# Patient Record
Sex: FEMALE | Race: WHITE | Employment: OTHER | URBAN - METROPOLITAN AREA
[De-identification: names, ages, dates, MRNs, and addresses within clinical notes are randomized per-mention and may not be internally consistent; named-entity substitution may affect disease eponyms.]

---

## 2017-09-08 ENCOUNTER — TELEPHONE (OUTPATIENT)
Dept: INTERNAL MEDICINE | Facility: CLINIC | Age: 57
End: 2017-09-08

## 2017-09-08 NOTE — TELEPHONE ENCOUNTER
Fax received from [facility] for review and signature.  Put in Dr. Walker's in basket.

## 2018-01-02 ENCOUNTER — OFFICE VISIT (OUTPATIENT)
Dept: URGENT CARE | Facility: URGENT CARE | Age: 58
End: 2018-01-02
Payer: COMMERCIAL

## 2018-01-02 VITALS
DIASTOLIC BLOOD PRESSURE: 82 MMHG | OXYGEN SATURATION: 96 % | RESPIRATION RATE: 16 BRPM | SYSTOLIC BLOOD PRESSURE: 154 MMHG | HEART RATE: 93 BPM | WEIGHT: 204.5 LBS | BODY MASS INDEX: 33.01 KG/M2 | TEMPERATURE: 97.6 F

## 2018-01-02 DIAGNOSIS — R03.0 ELEVATED BLOOD PRESSURE READING WITHOUT DIAGNOSIS OF HYPERTENSION: ICD-10-CM

## 2018-01-02 DIAGNOSIS — J01.40 ACUTE NON-RECURRENT PANSINUSITIS: Primary | ICD-10-CM

## 2018-01-02 PROCEDURE — 99214 OFFICE O/P EST MOD 30 MIN: CPT | Performed by: PHYSICIAN ASSISTANT

## 2018-01-02 NOTE — MR AVS SNAPSHOT
After Visit Summary   1/2/2018    Lisbet Archer    MRN: [id number]           Patient Information     Date Of Birth          1960        Visit Information        Provider Department      1/2/2018 8:45 PM Sonja Weeks PA-C [facility]        Today's Diagnoses     Acute non-recurrent pansinusitis    -  1       Follow-ups after your visit        Additional Services     OTOLARYNGOLOGY REFERRAL       Your provider has referred you to: [facility]: [facility] [phone]   [email or web address]    Please be aware that coverage of these services is subject to the terms and limitations of your health insurance plan.  Call member services at your health plan with any benefit or coverage questions.      Please bring the following with you to your appointment:    (1) Any X-Rays, CTs or MRIs which have been performed.  Contact the facility where they were done to arrange for  prior to your scheduled appointment.   (2) List of current medications  (3) This referral request   (4) Any documents/labs given to you for this referral                  Who to contact     If you have questions or need follow up information about today's clinic visit or your schedule please contact [facility] directly at [phone].  Normal or non-critical lab and imaging results will be communicated to you by MyChart, letter or phone within 4 business days after the clinic has received the results. If you do not hear from us within 7 days, please contact the clinic through MyChart or phone. If you have a critical or abnormal lab result, we will notify you by phone as soon as possible.  Submit refill requests through [employer] or call your pharmacy and they will forward the refill request to us. Please allow 3 business days for your refill to be completed.          Additional Information About Your Visit        MyChart Information      [employer] gives you secure access to your electronic health record. If you see a primary care provider, you can also send messages to your care team and make appointments. If you have questions, please call your primary care clinic.  If you do not have a primary care provider, please call [phone] and they will assist you.        Care EveryWhere ID     This is your Care EveryWhere ID. This could be used by other organizations to access your [place] medical records  [id number]        Your Vitals Were     Pulse Temperature Respirations Last Period Pulse Oximetry BMI (Body Mass Index)    93 97.6  F (36.4  C) (Oral) 16 12/22/2009 96% 33.01 kg/m2       Blood Pressure from Last 3 Encounters:   01/02/18 (!) 180/94   12/07/16 130/78   04/22/16 150/70    Weight from Last 3 Encounters:   01/02/18 204 lb 8 oz (92.8 kg)   04/22/16 210 lb (95.3 kg)   10/20/15 208 lb (94.3 kg)              We Performed the Following     OTOLARYNGOLOGY REFERRAL          Today's Medication Changes          These changes are accurate as of: 1/2/18  9:04 PM.  If you have any questions, ask your nurse or doctor.               Start taking these medicines.        Dose/Directions    amoxicillin-clavulanate 875-125 MG per tablet   Commonly known as:  AUGMENTIN   Used for:  Acute non-recurrent pansinusitis   Started by:  Sonja Weeks PA-C        Dose:  1 tablet   Take 1 tablet by mouth 2 times daily for 10 days   Quantity:  20 tablet   Refills:  0            Where to get your medicines      These medications were sent to [facility] [address]  [address] AT [facility] [place] [address]     Phone:  [phone]     amoxicillin-clavulanate 875-125 MG per tablet                Primary Care Provider Office Phone # Fax #    Neena Walker [phone]       [address]  [facility] [address]        Equal Access to Services     FARRAH PÉREZ AH: Abram hayes  kulwinder Bradford, wachenchoda luannamariaadaha, qaybta kalemuel casas, henri andryin hayaan rashadsiobhan marylukarolina laannabelbrie radha. So [facility] [phone].    ATENCIÓN: Si habla bria, tiene a clarke disposición servicios gratuitos de asistencia lingüística. Vi al [phone].    We comply with applicable federal civil rights laws and [place] laws. We do not discriminate on the basis of race, color, national origin, age, disability, sex, sexual orientation, or gender identity.            Thank you!     Thank you for choosing [facility]  for your care. Our goal is always to provide you with excellent care. Hearing back from our patients is one way we can continue to improve our services. Please take a few minutes to complete the written survey that you may receive in the mail after your visit with us. Thank you!             Your Updated Medication List - Protect others around you: Learn how to safely use, store and throw away your medicines at www.disposemymeds.org.          This list is accurate as of: 1/2/18  9:04 PM.  Always use your most recent med list.                   Brand Name Dispense Instructions for use Diagnosis    amoxicillin-clavulanate 875-125 MG per tablet    AUGMENTIN    20 tablet    Take 1 tablet by mouth 2 times daily for 10 days    Acute non-recurrent pansinusitis       fluticasone 50 MCG/ACT spray    FLONASE     Spray 1 spray into both nostrils as needed        gemfibrozil 600 MG tablet    LOPID    180 tablet    Take 1 tablet (600 mg) by mouth 2 times daily    Hypertriglyceridemia       ibuprofen 200 MG tablet    ADVIL/MOTRIN     1-2 TABLETS EVERY 4 TO 6 HOURS AS NEEDED

## 2018-01-03 ASSESSMENT — ENCOUNTER SYMPTOMS
SHORTNESS OF BREATH: 0
COUGH: 0
HEADACHES: 0
SINUS PAIN: 1
FEVER: 0
CHILLS: 0
VOMITING: 0
FOCAL WEAKNESS: 0
ABDOMINAL PAIN: 0
NAUSEA: 0
DIARRHEA: 0

## 2018-01-03 NOTE — NURSING NOTE
"Chief Complaint   Patient presents with     URI     facial pain/pressure. has been taking Mucinex, Allegra, and Ibuprofen       Initial BP (!) 180/94 (BP Location: Left arm, Patient Position: Sitting, Cuff Size: Adult Large)  Pulse 93  Temp 97.6  F (36.4  C) (Oral)  Resp 16  Wt 204 lb 8 oz (92.8 kg)  LMP 12/22/2009  SpO2 96%  BMI 33.01 kg/m2 Estimated body mass index is 33.01 kg/(m^2) as calculated from the following:    Height as of 10/20/15: 5' 6\" (1.676 m).    Weight as of this encounter: 204 lb 8 oz (92.8 kg).  Medication Reconciliation: complete     Princess CINDA Brandt CMA      "

## 2018-01-03 NOTE — PROGRESS NOTES
HPI  January 3, 2018    HPI: Lisbet Archer is a 57 year old female who complains of moderate congestion, facial pain & sinus pressure onset 2 weeks ago. Pt had chronic sinusitis issues 20 yrs ago, then went many years without issues, but the past few years has been getting 1-2 sinus infections per year. She wonders if this correlates with being around dogs in her home again. Also has hx allergic rhinitis and takes Allegra when she starts to feel symptoms coming on. Has been trying ibuprofen & Mucinex as well. Symptoms are constant in duration. Denies fever/chills, cough, HA, CP, SOB, abd pain, N/V/D, rash, or any other symptoms. Patient denies sick contacts.    Hx breast cancer, in remission.  BP noted to be elevated today, no hx HTN.    Past Medical History:   Diagnosis Date     Benign neoplasm of colon 1/06, 6/13    Tubular adenoma     Malignant neoplasm (H)     right breast     Personal history of malignant neoplasm of breast 6/90     Pure hyperglyceridemia      Past Surgical History:   Procedure Laterality Date     ARTHROSCOPY KNEE RT/LT  2008    left meniscal tear     C NONSPECIFIC PROCEDURE  6/90    Right mastectomy and chemo      COLONOSCOPY       HC REMOVAL GALLBLADDER  1989     Social History   Substance Use Topics     Smoking status: Never Smoker     Smokeless tobacco: Never Used     Alcohol use No     Patient Active Problem List   Diagnosis     Personal history of malignant neoplasm of breast     Benign neoplasm of colon     CARDIOVASCULAR SCREENING; LDL GOAL LESS THAN 160     Chronic low back pain     Knee pain     Neck pain     Hypertriglyceridemia     Elevated blood pressure reading without diagnosis of hypertension     Family History   Problem Relation Age of Onset     CANCER Mother      Lung     C.A.D. Mother      AAA     Eye Disorder Mother      Glaucoma     Genitourinary Problems Father      Renal failure     Respiratory Father      COPD     DIABETES Brother      DIABETES Sister      DIABETES  Sister         Problem list, Medication list, Allergies, and Medical/Social/Surgical histories reviewed in [facility] and updated as appropriate.      Review of Systems   Constitutional: Negative for chills and fever.   HENT: Positive for congestion and sinus pain.    Respiratory: Negative for cough and shortness of breath.    Cardiovascular: Negative for chest pain.   Gastrointestinal: Negative for abdominal pain, diarrhea, nausea and vomiting.   Skin: Negative for rash.   Neurological: Negative for focal weakness and headaches.   All other systems reviewed and are negative.        Physical Exam   Constitutional: She is oriented to person, place, and time and well-developed, well-nourished, and in no distress.   HENT:   Head: Normocephalic and atraumatic.   Right Ear: Tympanic membrane, external ear and ear canal normal.   Left Ear: Tympanic membrane, external ear and ear canal normal.   Nose: Mucosal edema present. Right sinus exhibits maxillary sinus tenderness and frontal sinus tenderness. Left sinus exhibits maxillary sinus tenderness and frontal sinus tenderness.   Mouth/Throat: Uvula is midline, oropharynx is clear and moist and mucous membranes are normal.   Worst sinus TTP over L frontal sinus   Cardiovascular: Normal rate, regular rhythm and normal heart sounds.    Pulmonary/Chest: Effort normal and breath sounds normal.   Musculoskeletal: Normal range of motion.   Neurological: She is alert and oriented to person, place, and time. Gait normal.   Skin: Skin is warm and dry.   Nursing note and vitals reviewed.      Vital Signs  /82 (BP Location: Left arm, Patient Position: Sitting, Cuff Size: Adult Large)  Pulse 93  Temp 97.6  F (36.4  C) (Oral)  Resp 16  Wt 204 lb 8 oz (92.8 kg)  LMP 12/22/2009  SpO2 96%  BMI 33.01 kg/m2     Diagnostic Test Results:  none     ASSESSMENT/PLAN      ICD-10-CM    1. Acute non-recurrent pansinusitis J01.40 amoxicillin-clavulanate (AUGMENTIN) 875-125 MG per tablet      OTOLARYNGOLOGY REFERRAL   2. Elevated blood pressure reading without diagnosis of hypertension R03.0       Rx Augmentin for sinusitis due to symptoms present > 10 days. Continue Allegra & Mucinex. Pt referred to ENT b/c she would like to discuss strategies to avoid chronic sinusitis again.    /94 on arrival, now 154/82. Has not been taking Sudafed or Dayquil. Advised checking BP a few times/week and keeping log, f/u with PCP regarding this.      I have discussed any lab or imaging results, the patient's diagnosis, and my plan of treatment with the patient and/or family. Patient is aware to come back in if with worsening symptoms or if no relief despite treatment plan.  Patient voiced understanding and had no further questions.       Follow Up: Return if symptoms worsen or fail to improve.    RIYA Reyes, PA-C  [facility]

## 2018-02-10 ENCOUNTER — HEALTH MAINTENANCE LETTER (OUTPATIENT)
Age: 58
End: 2018-02-10

## 2018-05-21 ENCOUNTER — OFFICE VISIT (OUTPATIENT)
Dept: INTERNAL MEDICINE | Facility: CLINIC | Age: 58
End: 2018-05-21
Payer: COMMERCIAL

## 2018-05-21 VITALS
HEART RATE: 80 BPM | TEMPERATURE: 98.1 F | WEIGHT: 199.9 LBS | OXYGEN SATURATION: 97 % | SYSTOLIC BLOOD PRESSURE: 138 MMHG | BODY MASS INDEX: 30.3 KG/M2 | DIASTOLIC BLOOD PRESSURE: 72 MMHG | RESPIRATION RATE: 16 BRPM | HEIGHT: 68 IN

## 2018-05-21 DIAGNOSIS — C50.911 MALIGNANT NEOPLASM OF RIGHT FEMALE BREAST, UNSPECIFIED ESTROGEN RECEPTOR STATUS, UNSPECIFIED SITE OF BREAST (H): ICD-10-CM

## 2018-05-21 DIAGNOSIS — E78.1 HYPERTRIGLYCERIDEMIA: ICD-10-CM

## 2018-05-21 DIAGNOSIS — Z78.0 ASYMPTOMATIC POSTMENOPAUSAL STATUS: ICD-10-CM

## 2018-05-21 DIAGNOSIS — Z00.00 ENCOUNTER FOR ROUTINE ADULT HEALTH EXAMINATION WITHOUT ABNORMAL FINDINGS: Primary | ICD-10-CM

## 2018-05-21 DIAGNOSIS — Z82.49 FAMILY HISTORY OF PREMATURE CORONARY HEART DISEASE: ICD-10-CM

## 2018-05-21 DIAGNOSIS — L98.9 SKIN LESION: ICD-10-CM

## 2018-05-21 LAB
ANION GAP SERPL CALCULATED.3IONS-SCNC: 9 MMOL/L (ref 3–14)
BUN SERPL-MCNC: 9 MG/DL (ref 7–30)
CALCIUM SERPL-MCNC: 9.3 MG/DL (ref 8.5–10.1)
CHLORIDE SERPL-SCNC: 103 MMOL/L (ref 94–109)
CHOLEST SERPL-MCNC: 193 MG/DL
CO2 SERPL-SCNC: 25 MMOL/L (ref 20–32)
CREAT SERPL-MCNC: 0.63 MG/DL (ref 0.52–1.04)
GFR SERPL CREATININE-BSD FRML MDRD: >90 ML/MIN/1.7M2
GLUCOSE SERPL-MCNC: 106 MG/DL (ref 70–99)
HDLC SERPL-MCNC: 28 MG/DL
LDLC SERPL CALC-MCNC: 114 MG/DL
NONHDLC SERPL-MCNC: 165 MG/DL
POTASSIUM SERPL-SCNC: 4.1 MMOL/L (ref 3.4–5.3)
SODIUM SERPL-SCNC: 137 MMOL/L (ref 133–144)
TRIGL SERPL-MCNC: 257 MG/DL

## 2018-05-21 PROCEDURE — 36415 COLL VENOUS BLD VENIPUNCTURE: CPT | Performed by: INTERNAL MEDICINE

## 2018-05-21 PROCEDURE — 99396 PREV VISIT EST AGE 40-64: CPT | Performed by: INTERNAL MEDICINE

## 2018-05-21 PROCEDURE — 80061 LIPID PANEL: CPT | Performed by: INTERNAL MEDICINE

## 2018-05-21 PROCEDURE — 86141 C-REACTIVE PROTEIN HS: CPT | Performed by: INTERNAL MEDICINE

## 2018-05-21 PROCEDURE — 80048 BASIC METABOLIC PNL TOTAL CA: CPT | Performed by: INTERNAL MEDICINE

## 2018-05-21 RX ORDER — GEMFIBROZIL 600 MG/1
600 TABLET, FILM COATED ORAL 2 TIMES DAILY
Qty: 180 TABLET | Refills: 3 | Status: SHIPPED | OUTPATIENT
Start: 2018-05-21 | End: 2020-03-26

## 2018-05-21 NOTE — PROGRESS NOTES
SUBJECTIVE:   CC: Lisbet Archer is an 57 year old woman who presents for preventive health visit.     Physical   Annual:     Getting at least 3 servings of Calcium per day::  NO    Bi-annual eye exam::  Yes    Dental care twice a year::  Yes    Sleep apnea or symptoms of sleep apnea::  None and Excessive snoring    Frequency of exercise::  2-3 days/week    Duration of exercise::  15-30 minutes    Taking medications regularly::  Yes    Medication side effects::  None    Additional concerns today::  YES            Current concerns:       Today's PHQ-2 Score:   PHQ-2 ( 1999 [employer]) 5/20/2018   Q1: Little interest or pleasure in doing things 0   Q2: Feeling down, depressed or hopeless 0   PHQ-2 Score 0   Q1: Little interest or pleasure in doing things Not at all   Q2: Feeling down, depressed or hopeless Not at all   PHQ-2 Score 0       Abuse: Current or Past(Physical, Sexual or Emotional)- No  Do you feel safe in your environment - Yes    Social History   Substance Use Topics     Smoking status: Never Smoker     Smokeless tobacco: Never Used     Alcohol use No     Alcohol Use 5/20/2018   If you drink alcohol do you typically have greater than 3 drinks per day OR greater than 7 drinks per week? Not Applicable   No flowsheet data found.    Reviewed orders with patient.  Reviewed health maintenance and updated orders accordingly - Yes      Patient over age 50, mutual decision to screen reflected in health maintenance.    Pertinent mammograms are reviewed under the imaging tab.  History of abnormal Pap smear: NO - age 30-65 PAP every 5 years with negative HPV co-testing recommended    Reviewed and updated as needed this visit by clinical staff         Reviewed and updated as needed this visit by Provider            Review of Systems    Physical Exam    Patient Active Problem List   Diagnosis     Breast cancer (H)     CARDIOVASCULAR SCREENING; LDL GOAL LESS THAN 160     Bilateral low back pain without sciatica      "Knee pain     Neck pain     Hypertriglyceridemia     Elevated blood pressure reading without diagnosis of hypertension     /  Current Outpatient Prescriptions   Medication Sig Dispense Refill     fluticasone (FLONASE) 50 MCG/ACT nasal spray Spray 1 spray into both nostrils as needed        gemfibrozil (LOPID) 600 MG tablet Take 1 tablet (600 mg) by mouth 2 times daily (Patient not taking: Reported on 5/21/2018) 180 tablet 3     IBUPROFEN 200 MG OR TABS 1-2 TABLETS EVERY 4 TO 6 HOURS AS NEEDED        Review Of Systems  Skin: few lesions  Eyes: negative  Ears/Nose/Throat: negative  Respiratory: No dyspnea on exertion and No cough  Cardiovascular: negative  Gastrointestinal: negative  Genitourinary: negative  Musculoskeletal: negative  Neurologic: negative  Psychiatric: negative  Hematologic/Lymphatic/Immunologic: negative  Endocrine: negative   Gynecologic ros reveals:no breast pain or new or enlarging lumps on self exam, no vaginal bleeding, no discharge or pelvic pain    Objective:  Patient alert, in no acute distress  /72 (BP Location: Left arm, Cuff Size: Adult Large)  Pulse 80  Temp 98.1  F (36.7  C) (Oral)  Resp 16  Ht 5' 7.75\" (1.721 m)  Wt 199 lb 14.4 oz (90.7 kg)  LMP 12/22/2009  SpO2 97%  BMI 30.62 kg/m2    HEENT: extraocular movements are intact, pupils equal and reactive to light and accommodation, TMs clear, oropharynx clear  NECK: Neck supple. No adenopathy. Thyroid symmetric, normal size,, Carotids without bruits.  PULMONARY: clear to auscultation  CARDIAC: regular rate and rhythm and no murmurs, clicks, or gallops  PULSES: 2/2 throughout  BACK: no spinal or CVAT  ABDOMINAL: Soft, nontender.  Normal bowel sounds.  No hepatosplenomegaly or abnormal masses  BREAST: right surgically absent, left No breast masses or tenderness, No axillary masses or tenderness and No galactorrhea  PELVIC: external genitalia normal,, bimanual exam with normal uterus and adnexa,   REFLEXES: 2+ throughout  SKIN: " "red patch at right lower breast, possible seb k on left leg, tag on back       ASSESSMENT/PLAN:   1. Encounter for routine adult health examination without abnormal findings    - Lipid panel reflex to direct LDL Fasting  - Basic metabolic panel  - DX Hip/Pelvis/Spine; Future    2. Skin lesion  Refer derm for check,   - DERMATOLOGY REFERRAL    3. Family history of premature coronary heart disease  Check lab  - Lipid panel reflex to direct LDL Fasting  - CRP cardiac risk    4. Hypertriglyceridemia  Renew med  - gemfibrozil (LOPID) 600 MG tablet; Take 1 tablet (600 mg) by mouth 2 times daily  Dispense: 180 tablet; Refill: 3    5. Malignant neoplasm of right female breast, unspecified estrogen receptor status, unspecified site of breast (H)    - *MA Screening Digital Bilateral; Future    6. Asymptomatic postmenopausal status    - DX Hip/Pelvis/Spine; Future    COUNSELING:  Reviewed preventive health counseling, as reflected in patient instructions    BP Screening:   Last 3 BP Readings:    BP Readings from Last 3 Encounters:   05/21/18 138/72   01/02/18 154/82   12/07/16 130/78       The following was recommended to the patient:  Re-screen BP within a year and recommended lifestyle modifications     reports that she has never smoked. She has never used smokeless tobacco.    Estimated body mass index is 33.01 kg/(m^2) as calculated from the following:    Height as of 10/20/15: 5' 6\" (1.676 m).    Weight as of 1/2/18: 204 lb 8 oz (92.8 kg).   Weight management plan: Discussed healthy diet and exercise guidelines and patient will follow up in 12 months in clinic to re-evaluate.    Counseling Resources:  ATP IV Guidelines  Pooled Cohorts Equation Calculator  Breast Cancer Risk Calculator  FRAX Risk Assessment  ICSI Preventive Guidelines  Dietary Guidelines for Americans, 2010  [employer] MyPlate  ASA Prophylaxis  Lung CA Screening    Neena Walker MD  [facility]  Answers for HPI/ROS submitted by the patient on " 5/20/2018   PHQ-2 Score: 0

## 2018-05-21 NOTE — MR AVS SNAPSHOT
After Visit Summary   5/21/2018    Lisbet Archer    MRN: [id number]           Patient Information     Date Of Birth          1960        Visit Information        Provider Department      5/21/2018 9:20 AM Neena Walker MD [facility]        Today's Diagnoses     Encounter for routine adult health examination without abnormal findings    -  1    Skin lesion        Family history of premature coronary heart disease          Care Instructions      PREVENTIVE HEALTH RECOMMENDATIONS:     Vaccines: Get a flu shot each year. Get a tetanus shot every 10 years.     Exercise for at least 150 minutes a week (an average of 30 minutes a day, 5 days of the week). This will help you control your weight and prevent disease.    Limit alcohol to one drink per day.    No smoking.     Wear sunscreen to prevent skin cancer.     See your dentist twice a year for an exam and cleaning.    Try to get Calcium 1200 mg total per day. It is best to not take it all at once. Try to get Vitamin D at least 6492-5330 units per day.    BMI or Body Mass Index is a way of indicating weight and health risk for cardiovascular diseases, high blood pressure, diabetes.   Definitions:    Underweight is less than 18.5 and will be associated with health risk.   Normal BMI is 18.5 to 25   Overweight is 25-29   Obesity is 30 or greater   Morbid Obesity is 40 or greater or 35 or greater with diabetes, prediabetes or abnormal blood sugar, high blood pressure or elevated cholesterol  Obesity and Morbid Obesity are associated with higher health risks. Lowering calories, exercising more may lower your BMI and even small decreases can have positive impact on lowering health risks.   Your Body mass index is 30.62 kg/(m^2)..,              Follow-ups after your visit        Additional Services     DERMATOLOGY REFERRAL       Your provider has referred you to: FMG: [facility] [phone]    [email or web address]  FMG: [facility] [place] [phone]  FHN: [facility] [place] [phone]   [email or web address]  N: [facility] [place] [phone]   [email or web address]    Please be aware that coverage of these services is subject to the terms and limitations of your health insurance plan.  Call member services at your health plan with any benefit or coverage questions.      Please bring the following with you to your appointment:    (1) Any X-Rays, CTs or MRIs which have been performed.  Contact the facility where they were done to arrange for  prior to your scheduled appointment.    (2) List of current medications  (3) This referral request   (4) Any documents/labs given to you for this referral                  Who to contact     If you have questions or need follow up information about today's clinic visit or your schedule please contact [facility] directly at [phone].  Normal or non-critical lab and imaging results will be communicated to you by [employer], letter or phone within 4 business days after the clinic has received the results. If you do not hear from us within 7 days, please contact the clinic through [employer] or phone. If you have a critical or abnormal lab result, we will notify you by phone as soon as possible.  Submit refill requests through [employer] or call your pharmacy and they will forward the refill request to us. Please allow 3 business days for your refill to be completed.          Additional Information About Your Visit        [employer] Information     [employer] gives you secure access to your electronic health record. If you see a primary care provider, you can also send messages to your care team and make appointments. If you have questions, please call your primary care clinic.  If you do not have a primary care provider, please call [phone]  "and they will assist you.        Care EveryWhere ID     This is your Care EveryWhere ID. This could be used by other organizations to access your [place] medical records  [id number]        Your Vitals Were     Pulse Temperature Respirations Height Last Period Pulse Oximetry    80 98.1  F (36.7  C) (Oral) 16 5' 7.75\" (1.721 m) 12/22/2009 97%    BMI (Body Mass Index)                   30.62 kg/m2            Blood Pressure from Last 3 Encounters:   05/21/18 138/72   01/02/18 154/82   12/07/16 130/78    Weight from Last 3 Encounters:   05/21/18 199 lb 14.4 oz (90.7 kg)   01/02/18 204 lb 8 oz (92.8 kg)   04/22/16 210 lb (95.3 kg)              We Performed the Following     Basic metabolic panel     CRP cardiac risk     DERMATOLOGY REFERRAL     Lipid panel reflex to direct LDL Fasting        Primary Care Provider Office Phone # Fax #    Neena Walker [phone]       [address] [facility] [address]  [facility] [address]        Equal Access to Services     [facility]: Hadii aad ku hadasho Soomaali, waaxda luqadaha, qaybta kaalmada adeegyada, henri thomas . So [facility] [phone].    ATENCIÓN: Si habla español, tiene a clarke disposición servicios gratuitos de asistencia lingüística. Llame al [phone].    We comply with applicable federal civil rights laws and [place] laws. We do not discriminate on the basis of race, color, national origin, age, disability, sex, sexual orientation, or gender identity.            Thank you!     Thank you for choosing [facility]  for your care. Our goal is always to provide you with excellent care. Hearing back from our patients is one way we can continue to improve our services. Please take a few minutes to complete the written survey that you may receive in the mail after your visit with us. Thank you!             Your Updated Medication List - Protect others around you: Learn how to safely use, store and throw away your medicines " at www.disposemymeds.org.          This list is accurate as of 5/21/18 10:06 AM.  Always use your most recent med list.                   Brand Name Dispense Instructions for use Diagnosis    fluticasone 50 MCG/ACT spray    FLONASE     Spray 1 spray into both nostrils as needed        gemfibrozil 600 MG tablet    LOPID    180 tablet    Take 1 tablet (600 mg) by mouth 2 times daily    Hypertriglyceridemia       ibuprofen 200 MG tablet    ADVIL/MOTRIN     1-2 TABLETS EVERY 4 TO 6 HOURS AS NEEDED

## 2018-05-21 NOTE — PATIENT INSTRUCTIONS
PREVENTIVE HEALTH RECOMMENDATIONS:     Vaccines: Get a flu shot each year. Get a tetanus shot every 10 years.     Exercise for at least 150 minutes a week (an average of 30 minutes a day, 5 days of the week). This will help you control your weight and prevent disease.    Limit alcohol to one drink per day.    No smoking.     Wear sunscreen to prevent skin cancer.     See your dentist twice a year for an exam and cleaning.    Try to get Calcium 1200 mg total per day. It is best to not take it all at once. Try to get Vitamin D at least 0638-8103 units per day.    BMI or Body Mass Index is a way of indicating weight and health risk for cardiovascular diseases, high blood pressure, diabetes.   Definitions:    Underweight is less than 18.5 and will be associated with health risk.   Normal BMI is 18.5 to 25   Overweight is 25-29   Obesity is 30 or greater   Morbid Obesity is 40 or greater or 35 or greater with diabetes, prediabetes or abnormal blood sugar, high blood pressure or elevated cholesterol  Obesity and Morbid Obesity are associated with higher health risks. Lowering calories, exercising more may lower your BMI and even small decreases can have positive impact on lowering health risks.   Your Body mass index is 30.62 kg/(m^2)..,

## 2018-05-22 LAB — CRP SERPL HS-MCNC: 6.7 MG/L

## 2018-05-22 RX ORDER — GEMFIBROZIL 600 MG/1
TABLET, FILM COATED ORAL
Qty: 180 TABLET | Refills: 3 | OUTPATIENT
Start: 2018-05-22

## 2018-05-22 NOTE — TELEPHONE ENCOUNTER
"Duplicate         Requested Prescriptions   Pending Prescriptions Disp Refills     gemfibrozil (LOPID) 600 MG tablet [Pharmacy Med Name: GEMFIBROZIL 600MG TABLETS] 180 tablet 3     Sig: TAKE 1 TABLET BY MOUTH TWICE DAILY    Fibrates Failed    5/21/2018  1:16 PM       Failed - Lipid panel on file in past 12 months    Recent Labs   Lab Test  05/21/18   1014  07/28/15   1031   CHOL  193  170   TRIG  257*  204*   HDL  28*  30*   LDL  114*  99   NHDL  165*   --    VLDL   --   41*   CHOLHDLRATIO   --   5.7*              Passed - No abnormal creatine kinase in past 12 months    No lab results found.            Passed - Recent (12 mo) or future (30 days) visit within the authorizing provider's specialty    Patient had office visit in the last 12 months or has a visit in the next 30 days with authorizing provider or within the authorizing provider's specialty.  See \"Patient Info\" tab in inbasket, or \"Choose Columns\" in Meds & Orders section of the refill encounter.           Passed - Patient is age 18 or older       Passed - No active pregnancy on record       Passed - No positive pregnancy test in past 12 months          "

## 2018-06-13 ENCOUNTER — HOSPITAL ENCOUNTER (OUTPATIENT)
Dept: MAMMOGRAPHY | Facility: CLINIC | Age: 58
Discharge: HOME | End: 2018-06-13
Attending: INTERNAL MEDICINE | Admitting: INTERNAL MEDICINE
Payer: COMMERCIAL

## 2018-06-13 ENCOUNTER — RADIANT APPOINTMENT (OUTPATIENT)
Dept: BONE DENSITY | Facility: CLINIC | Age: 58
End: 2018-06-13
Attending: INTERNAL MEDICINE
Payer: COMMERCIAL

## 2018-06-13 DIAGNOSIS — C50.911 MALIGNANT NEOPLASM OF RIGHT FEMALE BREAST, UNSPECIFIED ESTROGEN RECEPTOR STATUS, UNSPECIFIED SITE OF BREAST (H): ICD-10-CM

## 2018-06-13 DIAGNOSIS — Z00.00 ENCOUNTER FOR ROUTINE ADULT HEALTH EXAMINATION WITHOUT ABNORMAL FINDINGS: ICD-10-CM

## 2018-06-13 DIAGNOSIS — Z78.0 ASYMPTOMATIC POSTMENOPAUSAL STATUS: ICD-10-CM

## 2018-06-13 PROCEDURE — 77080 DXA BONE DENSITY AXIAL: CPT | Performed by: INTERNAL MEDICINE

## 2018-06-13 PROCEDURE — 77067 SCR MAMMO BI INCL CAD: CPT | Mod: 52

## 2018-08-25 ENCOUNTER — HEALTH MAINTENANCE LETTER (OUTPATIENT)
Age: 58
End: 2018-08-25

## 2019-01-01 ENCOUNTER — HOSPITAL ENCOUNTER (EMERGENCY)
Facility: CLINIC | Age: 59
Discharge: HOME | End: 2019-01-01
Attending: EMERGENCY MEDICINE | Admitting: EMERGENCY MEDICINE
Payer: COMMERCIAL

## 2019-01-01 VITALS
WEIGHT: 218.8 LBS | OXYGEN SATURATION: 94 % | HEIGHT: 67 IN | TEMPERATURE: 98.1 F | DIASTOLIC BLOOD PRESSURE: 105 MMHG | SYSTOLIC BLOOD PRESSURE: 194 MMHG | HEART RATE: 76 BPM | BODY MASS INDEX: 34.34 KG/M2

## 2019-01-01 DIAGNOSIS — S05.02XA ABRASION OF LEFT CORNEA, INITIAL ENCOUNTER: ICD-10-CM

## 2019-01-01 PROCEDURE — 99283 EMERGENCY DEPT VISIT LOW MDM: CPT

## 2019-01-01 PROCEDURE — 25000125 ZZHC RX 250: Performed by: EMERGENCY MEDICINE

## 2019-01-01 RX ORDER — SULFACETAMIDE SODIUM 100 MG/ML
1 SOLUTION/ DROPS OPHTHALMIC 4 TIMES DAILY
Qty: 1 ML | Refills: 0 | Status: SHIPPED | OUTPATIENT
Start: 2019-01-01 | End: 2019-01-06

## 2019-01-01 RX ORDER — PROPARACAINE HYDROCHLORIDE 5 MG/ML
1 SOLUTION/ DROPS OPHTHALMIC ONCE
Status: COMPLETED | OUTPATIENT
Start: 2019-01-01 | End: 2019-01-01

## 2019-01-01 RX ADMIN — PROPARACAINE HYDROCHLORIDE 1 DROP: 5 SOLUTION/ DROPS OPHTHALMIC at 22:38

## 2019-01-01 RX ADMIN — FLUORESCEIN SODIUM 1 STRIP: 1 STRIP OPHTHALMIC at 22:38

## 2019-01-01 ASSESSMENT — MIFFLIN-ST. JEOR: SCORE: 1600.1

## 2019-01-01 NOTE — ED AVS SNAPSHOT
Emergency Department  [address] [place] [address]  Phone:  [phone]  Fax:  [phone]                                    Lisbet Archer   MRN: [id number]    Department:   Emergency Department   Date of Visit:  1/1/2019           After Visit Summary Signature Page    I have received my discharge instructions, and my questions have been answered. I have discussed any challenges I see with this plan with the nurse or doctor.    ..........................................................................................................................................  Patient/Patient Representative Signature      ..........................................................................................................................................  Patient Representative Print Name and Relationship to Patient    ..................................................               ................................................  Date                                   Time    ..........................................................................................................................................  Reviewed by Signature/Title    ...................................................              ..............................................  Date                                               Time          22EPIC Rev 08/18

## 2019-01-02 ASSESSMENT — ENCOUNTER SYMPTOMS
EYE DISCHARGE: 0
EYE PAIN: 1
RESPIRATORY NEGATIVE: 1
PHOTOPHOBIA: 1
CONSTITUTIONAL NEGATIVE: 1

## 2019-01-02 NOTE — ED PROVIDER NOTES
"  History     Chief Complaint:  Eye Pain    HPI   Lisbet Archer is a 58 year old female with a history of breast cancer who presents to the ED for evaluation of eye pain. The patient reports that this evening around 2115, her dog scratched her left eye with his paw. She subsequently developed left eye pain and slightly blurred vision that she attributes to increased tears from that eye, so she presented to the ED for evaluation. Here in the ED, she states she had no other injuries. She denies any other symptoms or concerns. She has not taken any ibuprofen or tylenol at home for the pain. She does not wear contacts at baseline, and only wears glasses with driving. Of note, she does currently have a sinus infection, and is on day 8 of antibiotics.     Allergies:  Lorazepam    Medications:    Lopid  Flonase  Amoxicillin    Past Medical History:    Breast cancer  Hyperglyceridemia  Hypertriglyceridemia    Past Surgical History:    Knee arthroscopy  Colonoscopy  Cholecystectomy  Mastectomy    Family History:    Lung cancer  CAD  AAA  Glaucoma  Renal failure  COPD  Diabetes    Social History:  Marital Status:   [2]  Negative for tobacco use.  Negative for alcohol use.  Negative for drug use.     Review of Systems   Constitutional: Negative.    HENT: Negative.    Eyes: Positive for photophobia and pain. Negative for discharge.   Respiratory: Negative.        Physical Exam     Patient Vitals for the past 24 hrs:   BP Temp Temp src Pulse SpO2 Height Weight   01/01/19 2204 (!) 209/108 98.1  F (36.7  C) Oral 94 96 % 1.702 m (5' 7\") 99.2 kg (218 lb 12.8 oz)   Visual Acuity: 20/50 bilaterally    Physical Exam   Eyes:         General: woman sitting upright in room 10,  at bedside  HENT: mucous membranes moist  Eyes: PERRL.  No proptosis or evidence of open globe.    Fluorescein exam with uptake -see diagram.  Negative Nas's test.   Eye exam otherwise normal  No hyphema or hypopyon.    No evidence of foreign " body.    Upper and lower eyelids normal.    Resp: normal effort  MSK: no bony tenderness to face  Skin: no facial erythema or vesicles  Neuro: alert, clear speech, oriented  Psych: pleasant, cooperative      Emergency Department Course   Interventions:  proparacaine 0.54% ophthalmic solution 1 drop left eye  Fluorescein ophthalmic strip 1 strip left eye    Emergency Department Course:  Nursing notes and vitals reviewed. (6985) I performed an exam of the patient as documented above.     Medicine administered as documented above.    (2230) I rechecked the patient and discussed the results of her workup thus far. I performed a slit lamp exam, as noted above.    Findings and plan explained to the Patient. Patient discharged home with instructions regarding supportive care, medications, and reasons to return. The importance of close follow-up was reviewed. The patient was prescribed sulfacetamide.    I personally answered all related questions prior to discharge.     Impression & Plan      Medical Decision Making:  She has fairly classic signs and symptoms of a corneal abrasion.  She did not wish to have her tetanus updated.  No evidence of open globe or other complicating factor.  Prophylactic antibiotics were administered as well as recommendation to follow-up with an eye doctor within several days if not steadily improving.  Potential complications including ulceration and infection were reviewed.  She declined any further analgesics.  Discharged home in improved condition after discussion of the above.    Diagnosis:    ICD-10-CM    1. Abrasion of left cornea, initial encounter S05.02XA      Disposition:  discharged to home    Discharge Medications:     Medication List      Started    sulfacetamide 10 % ophthalmic solution  Commonly known as:  BLEPH-10  1 drop, Ophthalmic, 4 TIMES DAILY          Scribe Disclosure:  Tg LISA, am serving as a scribe on 1/1/2019 at 10:12 PM to personally document services  performed by Otto Lopez MD based on my observations and the provider's statements to me.     This record was created at least in part using electronic voice recognition software, so please excuse any typographical errors.    Tg Morrow  1/1/2019    EMERGENCY DEPARTMENT       Otto Lopez MD  01/02/19 0028

## 2019-01-16 ENCOUNTER — OFFICE VISIT (OUTPATIENT)
Dept: INTERNAL MEDICINE | Facility: CLINIC | Age: 59
End: 2019-01-16
Payer: COMMERCIAL

## 2019-01-16 ENCOUNTER — ANCILLARY PROCEDURE (OUTPATIENT)
Dept: GENERAL RADIOLOGY | Facility: CLINIC | Age: 59
End: 2019-01-16
Payer: COMMERCIAL

## 2019-01-16 VITALS
TEMPERATURE: 98.3 F | DIASTOLIC BLOOD PRESSURE: 88 MMHG | RESPIRATION RATE: 18 BRPM | WEIGHT: 209.7 LBS | SYSTOLIC BLOOD PRESSURE: 150 MMHG | HEIGHT: 67 IN | OXYGEN SATURATION: 96 % | HEART RATE: 88 BPM | BODY MASS INDEX: 32.91 KG/M2

## 2019-01-16 DIAGNOSIS — C50.911 MALIGNANT NEOPLASM OF RIGHT FEMALE BREAST, UNSPECIFIED ESTROGEN RECEPTOR STATUS, UNSPECIFIED SITE OF BREAST (H): ICD-10-CM

## 2019-01-16 DIAGNOSIS — J06.9 UPPER RESPIRATORY TRACT INFECTION, UNSPECIFIED TYPE: ICD-10-CM

## 2019-01-16 DIAGNOSIS — G43.009 MIGRAINE WITHOUT AURA AND WITHOUT STATUS MIGRAINOSUS, NOT INTRACTABLE: ICD-10-CM

## 2019-01-16 DIAGNOSIS — J06.9 UPPER RESPIRATORY TRACT INFECTION, UNSPECIFIED TYPE: Primary | ICD-10-CM

## 2019-01-16 LAB
ERYTHROCYTE [DISTWIDTH] IN BLOOD BY AUTOMATED COUNT: 12.5 % (ref 10–15)
HCT VFR BLD AUTO: 42.7 % (ref 35–47)
HGB BLD-MCNC: 14 G/DL (ref 11.7–15.7)
MCH RBC QN AUTO: 29.9 PG (ref 26.5–33)
MCHC RBC AUTO-ENTMCNC: 32.8 G/DL (ref 31.5–36.5)
MCV RBC AUTO: 91 FL (ref 78–100)
PLATELET # BLD AUTO: 305 10E9/L (ref 150–450)
RBC # BLD AUTO: 4.68 10E12/L (ref 3.8–5.2)
WBC # BLD AUTO: 7.1 10E9/L (ref 4–11)

## 2019-01-16 PROCEDURE — 99214 OFFICE O/P EST MOD 30 MIN: CPT | Performed by: INTERNAL MEDICINE

## 2019-01-16 PROCEDURE — 36415 COLL VENOUS BLD VENIPUNCTURE: CPT | Performed by: INTERNAL MEDICINE

## 2019-01-16 PROCEDURE — 71046 X-RAY EXAM CHEST 2 VIEWS: CPT

## 2019-01-16 PROCEDURE — 85027 COMPLETE CBC AUTOMATED: CPT | Performed by: INTERNAL MEDICINE

## 2019-01-16 RX ORDER — AZITHROMYCIN 250 MG/1
TABLET, FILM COATED ORAL
Qty: 6 TABLET | Refills: 0 | Status: SHIPPED | OUTPATIENT
Start: 2019-01-16 | End: 2020-03-26

## 2019-01-16 RX ORDER — ALBUTEROL SULFATE 90 UG/1
2 AEROSOL, METERED RESPIRATORY (INHALATION) EVERY 4 HOURS PRN
Qty: 1 INHALER | Refills: 2 | Status: SHIPPED | OUTPATIENT
Start: 2019-01-16 | End: 2022-09-07

## 2019-01-16 ASSESSMENT — MIFFLIN-ST. JEOR: SCORE: 1558.82

## 2019-01-16 NOTE — PROGRESS NOTES
"  SUBJECTIVE:   Lisbet Archer is a 58 year old female who presents to clinic today for the following health issues:  Patient here for possible upper respiratory infection, would also like to discuss her recent high blood pressures.    HPI:   For the past month she has been struggling with a chronic cough. Will get better then come back. Each time she was exposed to some one ill. Sputum is scant but yellow to green and foul tasting when she gets it up. Denies any fever chills or night sweats. Early on had severe nasal congestion and some facial pain. That has resolved. Last 2 days she has had some transient wheezing but load enough that the dog barked at her.     For the past 6 months she has been getting more frequent migraine headaches. She also has noted more fatigue The headaches have been manageable. But she notes the 6 months before they found her breast cancer she had more migraines and fatigue.       Problem list and histories reviewed & adjusted, as indicated.  Additional history: as documented    BP Readings from Last 3 Encounters:   01/16/19 150/88   01/01/19 (!) 194/105   05/21/18 138/72    Wt Readings from Last 3 Encounters:   01/16/19 95.1 kg (209 lb 11.2 oz)   01/01/19 99.2 kg (218 lb 12.8 oz)   05/21/18 90.7 kg (199 lb 14.4 oz)                    Reviewed and updated as needed this visit by clinical staff  Tobacco  Allergies  Meds  Med Hx  Surg Hx  Fam Hx  Soc Hx      Reviewed and updated as needed this visit by Provider         ROS:  Constitutional, HEENT, cardiovascular, pulmonary, GI, , musculoskeletal, neuro, skin, endocrine and psych systems are negative, except as otherwise noted.    OBJECTIVE:     /88 (BP Location: Right arm, Patient Position: Sitting, Cuff Size: Adult Large)   Pulse 88   Temp 98.3  F (36.8  C) (Oral)   Resp 18   Ht 1.702 m (5' 7\")   Wt 95.1 kg (209 lb 11.2 oz)   LMP 12/22/2009   SpO2 96%   BMI 32.84 kg/m    Body mass index is 32.84 kg/m .  GENERAL: " healthy, alert and no distress  EYES: Eyes grossly normal to inspection, PERRL and conjunctivae and sclerae normal  HENT: ear canals and TM's normal, nose and mouth without ulcers or lesions  NECK: no adenopathy, no asymmetry, masses, or scars and thyroid normal to palpation  RESP: lungs clear to auscultation - no rales, rhonchi or wheezes  CV: regular rate and rhythm, normal S1 S2, no S3 or S4, no murmur, click or rub, no peripheral edema and peripheral pulses strong  ABDOMEN: soft, nontender, no hepatosplenomegaly, no masses and bowel sounds normal  MS: no gross musculoskeletal defects noted, no edema  NEURO: Normal strength and tone, mentation intact and speech normal  PSYCH: mentation appears normal, affect normal/bright    CHEST X-RAY: unremarkable     ASSESSMENT/PLAN:       1. Upper respiratory tract infection, unspecified type     - XR Chest 2 Views; Future  - CBC with platelets  - azithromycin (ZITHROMAX) 250 MG tablet; Two tablets first day, then one tablet daily for four days.  Dispense: 6 tablet; Refill: 0  - albuterol (PROAIR HFA/PROVENTIL HFA/VENTOLIN HFA) 108 (90 Base) MCG/ACT inhaler; Inhale 2 puffs into the lungs every 4 hours as needed for shortness of breath / dyspnea or wheezing  Dispense: 1 Inhaler; Refill: 2    2. Malignant neoplasm of right female breast, unspecified estrogen receptor status, unspecified site of breast (H)  offered reassurance last mammogram 6/18 and cxr normal     3. Migraine without aura and without status migrainosus, not intractable  Usual Conservative treatment recommended.       Marcie Devries MD  [facility]

## 2019-02-02 ENCOUNTER — OFFICE VISIT (OUTPATIENT)
Dept: URGENT CARE | Facility: URGENT CARE | Age: 59
End: 2019-02-02
Payer: COMMERCIAL

## 2019-02-02 ENCOUNTER — HOSPITAL ENCOUNTER (EMERGENCY)
Facility: CLINIC | Age: 59
Discharge: HOME | End: 2019-02-02
Attending: EMERGENCY MEDICINE | Admitting: EMERGENCY MEDICINE
Payer: COMMERCIAL

## 2019-02-02 ENCOUNTER — NURSE TRIAGE (OUTPATIENT)
Dept: NURSING | Facility: CLINIC | Age: 59
End: 2019-02-02

## 2019-02-02 VITALS
WEIGHT: 209 LBS | HEIGHT: 67 IN | OXYGEN SATURATION: 98 % | BODY MASS INDEX: 32.8 KG/M2 | RESPIRATION RATE: 18 BRPM | SYSTOLIC BLOOD PRESSURE: 198 MMHG | DIASTOLIC BLOOD PRESSURE: 100 MMHG

## 2019-02-02 VITALS
TEMPERATURE: 97.6 F | DIASTOLIC BLOOD PRESSURE: 97 MMHG | RESPIRATION RATE: 16 BRPM | OXYGEN SATURATION: 97 % | WEIGHT: 213 LBS | HEART RATE: 88 BPM | SYSTOLIC BLOOD PRESSURE: 182 MMHG | BODY MASS INDEX: 33.36 KG/M2

## 2019-02-02 DIAGNOSIS — J01.90 ACUTE SINUSITIS TREATED WITH ANTIBIOTICS IN THE PAST 60 DAYS: Primary | ICD-10-CM

## 2019-02-02 DIAGNOSIS — R03.0 ELEVATED BLOOD PRESSURE READING WITHOUT DIAGNOSIS OF HYPERTENSION: ICD-10-CM

## 2019-02-02 DIAGNOSIS — H57.12 ACUTE LEFT EYE PAIN: ICD-10-CM

## 2019-02-02 DIAGNOSIS — Z87.828 HISTORY OF CORNEAL ABRASION: ICD-10-CM

## 2019-02-02 PROCEDURE — 99283 EMERGENCY DEPT VISIT LOW MDM: CPT

## 2019-02-02 PROCEDURE — 99213 OFFICE O/P EST LOW 20 MIN: CPT | Performed by: FAMILY MEDICINE

## 2019-02-02 RX ORDER — DICLOFENAC SODIUM 1 MG/ML
1 SOLUTION/ DROPS OPHTHALMIC 4 TIMES DAILY PRN
Qty: 1 BOTTLE | Refills: 0 | Status: SHIPPED | OUTPATIENT
Start: 2019-02-02 | End: 2019-03-04

## 2019-02-02 RX ORDER — DICLOFENAC SODIUM 1 MG/ML
1 SOLUTION/ DROPS OPHTHALMIC 4 TIMES DAILY
COMMUNITY
End: 2022-09-07

## 2019-02-02 RX ORDER — PROPARACAINE HYDROCHLORIDE 5 MG/ML
1 SOLUTION/ DROPS OPHTHALMIC ONCE
Status: DISCONTINUED | OUTPATIENT
Start: 2019-02-02 | End: 2019-02-02 | Stop reason: HOSPADM

## 2019-02-02 ASSESSMENT — ENCOUNTER SYMPTOMS
EYE DISCHARGE: 1
EYE PAIN: 1
EYE ITCHING: 1
FEVER: 0
EYE REDNESS: 1
PHOTOPHOBIA: 1

## 2019-02-02 ASSESSMENT — PAIN SCALES - GENERAL: PAINLEVEL: WORST PAIN (10)

## 2019-02-02 ASSESSMENT — MIFFLIN-ST. JEOR: SCORE: 1555.65

## 2019-02-02 NOTE — DISCHARGE INSTRUCTIONS
Discharge Instructions  Hypertension - High Blood Pressure    During you visit to the Emergency Department, your blood pressure was higher than the recommended blood pressure.  This may be related to stress, pain, medication or other temporary conditions. In these cases, your blood pressure may return to normal on its own. If you have a history of high blood pressure, you may need to have your provider adjust your medications. Sometimes, your high measurement here may indicate that you have developed high blood pressure that will stay high unless it is treated. As a general rule, high blood pressure causes problems over years rather than days, weeks, or months. So, while it is important to treat blood pressure, it is rarely important to treat blood pressure immediately. Occasionally we will begin a medication in the Emergency Department; more often we will recommend close follow-up for medications with a primary doctor/clinic.    Generally, every Emergency Department visit should have a follow-up clinic visit with either a primary or a specialty clinic/provider. Please follow-up as instructed by your emergency provider today.    Return to the Emergency Department if you start to have:  A severe headache.  Chest pain.  Shortness of breath.  Weakness or numbness that affects one part of the body.  Confusion.  Vision changes.  Significant swelling of legs and/or eyes.  A reaction to any medication started in the Emergency Department.    What can I do to help myself?  Avoid alcohol.  Take any blood pressure medicine that you are prescribed.  Get a good night?s sleep.  Lower your salt intake.  Exercise.  Lose weight.  Manage stress.  See your doctor regularly    If blood pressure medication was started in the Emergency Department:  The medicine may not have an immediate effect. The body and brain determine what blood pressure you have. The medicine?s job is to retrain the body?s ?thermostat? to a lower blood  pressure.  You will need to follow up with your provider to see how this medicine is working for you.  If you were given a prescription for medicine here today, be sure to read all of the information (including the package insert) that comes with your prescription.  This will include important information about the medicine, its side effects, and any warnings that you need to know about.  The pharmacist who fills the prescription can provide more information and answer questions you may have about the medicine.  If you have questions or concerns that the pharmacist cannot address, please call or return to the Emergency Department.   Remember that you can always come back to the Emergency Department if you are not able to see your regular provider in the amount of time listed above, if you get any new symptoms, or if there is anything that worries you.

## 2019-02-02 NOTE — ED PROVIDER NOTES
"  History     Chief Complaint:  Eye pain and problem    HPI   Lisbet Archer is a 58 year old female who presents with left eye discomfort. The patient was initially seen on 1/1/2019 for an abrasion to her left eye after a pet had accidentally scratched it. The eye has since healed well. Over the past few days the patient notes that she was coming down with a URI type illness and this morning she woke up with tearing, mattering, and a crusted over left eye. She was having associated discomfort in the left eye which made it hard to keep it open as bright lights and blinking seemed to provoke the pain. She has no outright vision loss but has a hard time looking as it is difficult to keep the eye open. The patient denies any known injury or exposure to metal dust or extremely bright light sources. She denies fevers, or issues in her right eye.    Tetanus: 2006    Allergies:  Lorazepam - hallucinations     Medications:    Albuterol inhaler  Flonase  Lopid  Ibuprofen    Past Medical History:    Migraine  Malignant neoplasm of breast    Past Surgical History:    Left knee meniscal repair  Colonoscopy  Cholecystectomy  Mastectomy     Family History:    Lung cancer, AAA, Glaucoma, COPD - mother and father    Social History:  Smoking Status: Never Smoker  Alcohol Use: No  Marital Status:        Review of Systems   Constitutional: Negative for fever.   Eyes: Positive for photophobia, pain, discharge, redness and itching. Negative for visual disturbance.   All other systems reviewed and are negative.      Physical Exam   First Vitals:  BP: (!) 198/100  Heart Rate: 113  Resp: 18  Height: 170.2 cm (5' 7\")  Weight: 94.8 kg (209 lb)  SpO2: 98 %      Physical Exam  Eyes:  L sclera injected, no foreign body on flipping eyelids.  Pupils are equal and round    L photophobia, pressures 26 left and 26 right, slit lamp w/o cells and flare, fluorescein w/o areas of uptake though previous abrasion region is noted as an " abnormality in the surface  ENT:    External ears and nares normal  CV:  Rate as above with regular rhythm   Resp:  Breath sounds clear and equal bilaterally  MS:  Moves all extremities  Skin:  Warm and dry  Neuro:  Speech is normal and fluent. No apparent deficit.        Emergency Department Course     Interventions:  Proparacaine drop, left eye     Emergency Department Course:  Past medical records, nursing notes, and vitals reviewed.  0710: I performed an exam of the patient and obtained history, as documented above.    Detailed slit lamp exam Findings and plan explained to the Patient. Patient discharged home with instructions regarding supportive care, medications, and reasons to return. The importance of close follow-up was reviewed.      Impression & Plan      Medical Decision Making:  Lisbet Archer is here for evaluation of recurrent left eye redness and pain.  Workup is most suggestive of a superficial process, possibly related to pinkeye or healing of previous abrasion.  There is no evidence of active ulcer or abrasion on the surface of the eye.  Symptoms completely resolved after use of proparacaine.  Tetanus is confirmed to be up-to-date.  She will reinitiate the antibiotic drops that she has and follow-up closely with ophthalmology for reevaluation.  Visual acuity better in affected eye than non-affected eye.    Regarding the elevated BP, there is no chest pain or headache to suggest end organ dysfunction.  Reports history of white coat hypertension.  She will need close follow-up in clinic for reassessment and medication adjustment if persistently elevated.     After visit I spoke to pharmacist at [facility] to clarify discharge fluorescein order.  This was an error on my part as it was intended as the order for the strip while patient was here.  Also patient's antibiotic drop had opened and spilled.  New prescription to change antibiotic (in case resistant) to polytrim.    Diagnosis:    ICD-10-CM     1. Acute left eye pain H57.12    2. History of corneal abrasion Z87.828    3. Elevated blood pressure reading without diagnosis of hypertension R03.0      Discharge Medications:     diclofenac 0.1 % ophthalmic solution  Commonly known as:  VOLTAREN  1 drop, Left Eye, 4 TIMES DAILY PRN     fluorescein 0.6 MG ophthalmic strip  Commonly known as:  FUL-NORM  1 strip, Left Eye, ONCE        ASK your doctor about these medications    sulfacetamide 10 % ophthalmic solution  Commonly known as:  BLEPH-10  1 drop, Ophthalmic, 4 TIMES DAILY  Ask about: Should I take this medication?          Migel Overton  2/2/2019    EMERGENCY DEPARTMENT  I, Migel Overton, am serving as a scribe at 7:10 AM on 2/2/2019 to document services personally performed by Isis Hernández MD based on my observations and the provider's statements to me.       Isis Hernández MD  02/02/19 1005       Isis Hernández MD  02/02/19 1006

## 2019-02-02 NOTE — TELEPHONE ENCOUNTER
Patient calling reporting having sinus pain. Patient was seen at the emergency department this morning for eye pain. States her sinus pain was not addressed at the emergency department. States pain is not severe but has been taking ibuprofen for her eye pain already. Denies fever. Reports redness and swelling on her cheek. States did not have the redness and swelling this morning was seen at the emergency department. Advised patient to be seen within 4 hours. Caller verbalized understanding. Denies further questions.      Riaz Armendariz RN  [facility]     Reason for Disposition    [1] Redness or swelling on the cheek, forehead or around the eye AND [2] no fever    Additional Information    Negative: Severe difficulty breathing (e.g., struggling for each breath, speaks in single words)    Negative: Sounds like a life-threatening emergency to the triager    Negative: [1] Difficulty breathing AND [2] not from stuffy nose (e.g., not relieved by cleaning out the nose)    Negative: [1] SEVERE headache AND [2] fever    Negative: [1] Redness or swelling on the cheek, forehead or around the eye AND [2] fever    Negative: Fever > 104 F (40 C)    Negative: Patient sounds very sick or weak to the triager    Negative: [1] SEVERE pain AND [2] not improved 2 hours after pain medicine    Protocols used: SINUS PAIN OR CONGESTION-ADULT-AH

## 2019-12-16 ENCOUNTER — HEALTH MAINTENANCE LETTER (OUTPATIENT)
Age: 59
End: 2019-12-16

## 2020-02-05 NOTE — PROGRESS NOTES
SUBJECTIVE:   Lisbet Archer is a 58 year old female who presents to clinic today for the following health issues:    HPI     Treated this AM in ED for acute left eye pain/hx corneal abrasion in early Januarry- blood pressure still high from this.  Had increased sinus pain and pressure this week.  Treated with amoxicillin right before the holidays-- then a Zpak in early January.  Now new pain and pressure along LEFT frontal and maxillary sinuses.  No fever or chills.  No cough.    Problem list and histories reviewed & adjusted, as indicated.  Additional history: as documented        Patient Active Problem List   Diagnosis     Breast cancer (H)     CARDIOVASCULAR SCREENING; LDL GOAL LESS THAN 160     Bilateral low back pain without sciatica     Knee pain     Neck pain     Hypertriglyceridemia     Elevated blood pressure reading without diagnosis of hypertension     Migraine without aura and without status migrainosus, not intractable     Past Surgical History:   Procedure Laterality Date     ARTHROSCOPY KNEE RT/LT  2008    left meniscal tear     COLONOSCOPY       HC REMOVAL GALLBLADDER  1989     MASTECTOMY Right        Social History     Tobacco Use     Smoking status: Never Smoker     Smokeless tobacco: Never Used   Substance Use Topics     Alcohol use: No     Family History   Problem Relation Age of Onset     Cancer Mother         Lung     C.A.D. Mother         AAA     Eye Disorder Mother         Glaucoma     Genitourinary Problems Father         Renal failure     Respiratory Father         COPD     Diabetes Brother      Diabetes Sister      Diabetes Sister          Current Outpatient Medications   Medication Sig Dispense Refill     albuterol (PROAIR HFA/PROVENTIL HFA/VENTOLIN HFA) 108 (90 Base) MCG/ACT inhaler Inhale 2 puffs into the lungs every 4 hours as needed for shortness of breath / dyspnea or wheezing 1 Inhaler 2     diclofenac (VOLTAREN) 0.1 % ophthalmic solution Place 1 drop Into the left eye 4 times  "daily as needed for moderate pain 1 Bottle 0     diclofenac (VOLTAREN) 0.1 % ophthalmic solution 1 drop 4 times daily       fluorescein (FUL-NORM) 0.6 MG ophthalmic strip Place 600 mcg Into the left eye once for 1 dose 1 strip 0     gemfibrozil (LOPID) 600 MG tablet Take 1 tablet (600 mg) by mouth 2 times daily 180 tablet 3     azithromycin (ZITHROMAX) 250 MG tablet Two tablets first day, then one tablet daily for four days. (Patient not taking: Reported on 2/2/2019) 6 tablet 0     fluticasone (FLONASE) 50 MCG/ACT nasal spray Spray 1 spray into both nostrils as needed        IBUPROFEN 200 MG OR TABS 1-2 TABLETS EVERY 4 TO 6 HOURS AS NEEDED (Patient not taking: Reported on 2/2/2019)       Allergies   Allergen Reactions     Lorazepam      ATIVAN \"HALLUCINATIONS\"     Recent Labs   Lab Test 05/21/18  1014 07/28/15  1031 07/10/14  1016  06/06/11  0916 01/05/11  1521   * 99 128   < > 139*  --    HDL 28* 30* 28*   < > 32*  --    TRIG 257* 204* 311*   < > 150  --    ALT  --  46 43  --  23 31   CR 0.63 0.63 0.69   < >  --  0.70   GFRESTIMATED >90 >90  Non  GFR Calc   89   < >  --  88   GFRESTBLACK >90 >90   GFR Calc   >90   < >  --  >90   POTASSIUM 4.1 4.5 4.3   < >  --  4.0   TSH  --   --   --   --   --  0.53    < > = values in this interval not displayed.      BP Readings from Last 3 Encounters:   02/02/19 (!) 182/97   02/02/19 (!) 198/100   01/16/19 150/88    Wt Readings from Last 3 Encounters:   02/02/19 96.6 kg (213 lb)   02/02/19 94.8 kg (209 lb)   01/16/19 95.1 kg (209 lb 11.2 oz)                    ROS:  Constitutional, HEENT, cardiovascular, pulmonary, gi and gu systems are negative, except as otherwise noted.    OBJECTIVE:     BP (!) 182/97   Pulse 88   Temp 97.6  F (36.4  C) (Oral)   Resp 16   Wt 96.6 kg (213 lb)   LMP 12/22/2009   SpO2 97%   BMI 33.36 kg/m    Body mass index is 33.36 kg/m .  GENERAL: healthy, alert and no distress  EYES: Eyes grossly normal to " inspection, PERRL and conjunctivae and sclerae normal  HENT: ear canals and TM's normal, nose and mouth without ulcers or lesions, increased sinus pain and pressure over LEFT frontal and LEFT maxillary sinuses today  NECK: no adenopathy, no asymmetry, masses, or scars and thyroid normal to palpation  RESP: lungs clear to auscultation - no rales, rhonchi or wheezes  CV: regular rate and rhythm, normal S1 S2, no S3 or S4, no murmur, click or rub, no peripheral edema and peripheral pulses strong  ABDOMEN: soft, nontender, no hepatosplenomegaly, no masses and bowel sounds normal  MS: no gross musculoskeletal defects noted, no edema  PSYCH: mentation appears normal, affect normal/bright    ASSESSMENT/PLAN:     1. Acute sinusitis treated with antibiotics in the past 60 days    - amoxicillin-clavulanate (AUGMENTIN) 875-125 MG tablet; Take 1 tablet by mouth 2 times daily for 10 days  Dispense: 20 tablet; Refill: 0    Will treat with Augmentin bid x 10 days.  Recommend increased fluids and  Close Follow-up if no change or worsening symptoms prn.    CS Urgent Care Provider  [facility]   negative - no fever

## 2020-03-26 ENCOUNTER — VIRTUAL VISIT (OUTPATIENT)
Dept: INTERNAL MEDICINE | Facility: CLINIC | Age: 60
End: 2020-03-26
Payer: COMMERCIAL

## 2020-03-26 DIAGNOSIS — J01.00 ACUTE NON-RECURRENT MAXILLARY SINUSITIS: Primary | ICD-10-CM

## 2020-03-26 DIAGNOSIS — E78.1 HYPERTRIGLYCERIDEMIA: ICD-10-CM

## 2020-03-26 PROCEDURE — 99214 OFFICE O/P EST MOD 30 MIN: CPT | Mod: TEL | Performed by: INTERNAL MEDICINE

## 2020-03-26 RX ORDER — GEMFIBROZIL 600 MG/1
600 TABLET, FILM COATED ORAL 2 TIMES DAILY
Qty: 180 TABLET | Refills: 3 | Status: SHIPPED | OUTPATIENT
Start: 2020-03-26 | End: 2021-06-11

## 2020-03-26 RX ORDER — AZITHROMYCIN 250 MG/1
TABLET, FILM COATED ORAL
Qty: 6 TABLET | Refills: 0 | Status: SHIPPED | OUTPATIENT
Start: 2020-03-26 | End: 2020-03-31

## 2020-03-26 NOTE — PROGRESS NOTES
Subjective     Lisbet Archer is a 59 year old female who presents for telephone visit today for the following health issues:    HPI     Sinus symptoms: She reports she was having some symptoms in early January with a lot of nasal congestion, some pressure.  She treated with nasal spray, steam, allergy medications ibuprofen.  That seemed to work fairly well and the symptoms seem to resolve other than some very mild cough and minor congestion than 2 to 3 weeks ago the symptoms increased again.  She continue doing over-the-counter treatment but despite that 2 days ago she suddenly had significant worsening of pressure, pain.  She was getting purulent mucus when she would do nasal flushing.  Yesterday it was worse with pain in her teeth, throbbing when she bends forward.  She has been having some malaise/fatigue with it.  She has some postnasal drainage.  The cough is overall stable.    She also is going to need a refill of her gemfibrozil soon.  She had not had her labs done last year but they have been stable for many years.      Patient Active Problem List   Diagnosis     Breast cancer (H)     CARDIOVASCULAR SCREENING; LDL GOAL LESS THAN 160     Bilateral low back pain without sciatica     Knee pain     Neck pain     Hypertriglyceridemia     Elevated blood pressure reading without diagnosis of hypertension     Migraine without aura and without status migrainosus, not intractable     Current Outpatient Medications   Medication Sig Dispense Refill     diclofenac (VOLTAREN) 0.1 % ophthalmic solution 1 drop 4 times daily       fexofenadine (ALLEGRA) 30 MG ODT Take 30 mg by mouth as needed for allergies       fluticasone (FLONASE) 50 MCG/ACT nasal spray Spray 1 spray into both nostrils as needed        gemfibrozil (LOPID) 600 MG tablet Take 1 tablet (600 mg) by mouth 2 times daily 180 tablet 3     IBUPROFEN 200 MG OR TABS 1-2 TABLETS EVERY 4 TO 6 HOURS AS NEEDED       albuterol (PROAIR HFA/PROVENTIL HFA/VENTOLIN HFA)  108 (90 Base) MCG/ACT inhaler Inhale 2 puffs into the lungs every 4 hours as needed for shortness of breath / dyspnea or wheezing 1 Inhaler 2      Social History     Tobacco Use     Smoking status: Never Smoker     Smokeless tobacco: Never Used   Substance Use Topics     Alcohol use: No     Drug use: No          Reviewed and updated as needed this visit by Provider         Review of Systems     No fever, chills, stable cough, chest pain.             Assessment & Plan     1. Acute non-recurrent maxillary sinusitis  Her symptoms are consistent with bacterial sinusitis.  She has tried over-the-counter treatment but despite that her symptoms have persisted for few weeks and have worsened.  We will go ahead and treat with Z-Xavier, recommend Mucinex over-the-counter and continue doing the rest of her over-the-counter treatments.  She had some concerns about taking ibuprofen because of news related to coronavirus but advised for few days it is acceptable to take that to help with the pressure and pain.  - azithromycin (ZITHROMAX) 250 MG tablet; Take 2 tablets (500 mg) by mouth daily for 1 day, THEN 1 tablet (250 mg) daily for 4 days.  Dispense: 6 tablet; Refill: 0    2. Hypertriglyceridemia  We will refill medication for now, advised we are delaying physicals till later in the summer because of the pandemic, can schedule them.  - gemfibrozil (LOPID) 600 MG tablet; Take 1 tablet (600 mg) by mouth 2 times daily  Dispense: 180 tablet; Refill: 3       7 minutes and 8 seconds spent on phone visit.    Neena Walker MD  [facility]

## 2020-08-13 ENCOUNTER — VIRTUAL VISIT (OUTPATIENT)
Dept: INTERNAL MEDICINE | Facility: CLINIC | Age: 60
End: 2020-08-13
Payer: COMMERCIAL

## 2020-08-13 DIAGNOSIS — J01.01 ACUTE RECURRENT MAXILLARY SINUSITIS: Primary | ICD-10-CM

## 2020-08-13 DIAGNOSIS — J31.0 CHRONIC RHINITIS: ICD-10-CM

## 2020-08-13 PROCEDURE — 99213 OFFICE O/P EST LOW 20 MIN: CPT | Mod: 95 | Performed by: INTERNAL MEDICINE

## 2020-08-13 RX ORDER — GUAIFENESIN AND DEXTROMETHORPHAN HYDROBROMIDE 600; 30 MG/1; MG/1
1 TABLET, EXTENDED RELEASE ORAL EVERY 12 HOURS
COMMUNITY
End: 2022-09-07

## 2020-08-13 RX ORDER — AZITHROMYCIN 250 MG/1
TABLET, FILM COATED ORAL
Qty: 6 TABLET | Refills: 0 | Status: SHIPPED | OUTPATIENT
Start: 2020-08-13 | End: 2020-08-18

## 2020-08-13 RX ORDER — FLUTICASONE PROPIONATE 50 MCG
2 SPRAY, SUSPENSION (ML) NASAL DAILY
Qty: 18.2 ML | Refills: 11 | Status: SHIPPED | OUTPATIENT
Start: 2020-08-13

## 2020-08-13 NOTE — PROGRESS NOTES
"Lisbet Archer is a 60 year old female who is being evaluated via a billable video visit.      The patient has been notified of following:     \"This video visit will be conducted via a call between you and your physician/provider. We have found that certain health care needs can be provided without the need for an in-person physical exam.  This service lets us provide the care you need with a video conversation.  If a prescription is necessary we can send it directly to your pharmacy.  If lab work is needed we can place an order for that and you can then stop by our lab to have the test done at a later time.    Video visits are billed at different rates depending on your insurance coverage.  Please reach out to your insurance provider with any questions.    If during the course of the call the physician/provider feels a video visit is not appropriate, you will not be charged for this service.\"    Patient has given verbal consent for Video visit? Yes  How would you like to obtain your AVS? MyChart  If you are dropped from the video visit, the video invite should be resent to: Send to e-mail at: [email or web address]  Will anyone else be joining your video visit? No    Subjective     Lisbet Archer is a 60 year old female who presents today via video visit for the following health issues:    \[facility] [place] [facility]\""        Video Start Time: 10:40    She presents with concerns about sinus symptoms: She started to develop some increased nasal congestion, pressure, mild sore throat and mild malaise about 2 to 3 weeks ago.  She gradually developed more right-sided sinus pressure, some postnasal drainage with thick mucus.  She started using her [place] pot, Mucinex, Allegra and symptoms were remaining fairly stable but then for the past few days she has had more significant right-sided headache, pain and pressure, it bothered her teeth, she has had thicker postnasal drainage.    She was treated at the end of February following an acute " bronchitis for sinus infection.  She was treated at the end of March.  Both of those resolved without any problems.          Patient Active Problem List   Diagnosis     Breast cancer (H)     CARDIOVASCULAR SCREENING; LDL GOAL LESS THAN 160     Bilateral low back pain without sciatica     Knee pain     Neck pain     Hypertriglyceridemia     Elevated blood pressure reading without diagnosis of hypertension     Migraine without aura and without status migrainosus, not intractable     Current Outpatient Medications   Medication Sig Dispense Refill     dextromethorphan-guaiFENesin (MUCINEX DM)  MG 12 hr tablet Take 1 tablet by mouth every 12 hours       diclofenac (VOLTAREN) 0.1 % ophthalmic solution 1 drop 4 times daily       fexofenadine (ALLEGRA) 30 MG ODT Take 30 mg by mouth as needed for allergies       gemfibrozil (LOPID) 600 MG tablet Take 1 tablet (600 mg) by mouth 2 times daily 180 tablet 3     IBUPROFEN 200 MG OR TABS 1-2 TABLETS EVERY 4 TO 6 HOURS AS NEEDED       sodium chloride (OCEAN) 0.65 % nasal spray Spray 1 spray into both nostrils daily as needed for congestion       albuterol (PROAIR HFA/PROVENTIL HFA/VENTOLIN HFA) 108 (90 Base) MCG/ACT inhaler Inhale 2 puffs into the lungs every 4 hours as needed for shortness of breath / dyspnea or wheezing 1 Inhaler 2     fluticasone (FLONASE) 50 MCG/ACT nasal spray Spray 1 spray into both nostrils as needed         Social History     Tobacco Use     Smoking status: Never Smoker     Smokeless tobacco: Never Used   Substance Use Topics     Alcohol use: No     Drug use: No      Reviewed and updated as needed this visit by Provider         Review of Systems   No fever, chills, sore throat is better, still some right-sided headache, facial pain, postnasal drainage, no significant rhinorrhea, no significant cough, no shortness of breath      Objective           Vitals:  No vitals were obtained today due to virtual visit.    Physical Exam     GENERAL: Healthy, alert  and no distress  EYES: Eyes grossly normal to inspection.  No discharge or erythema, or obvious scleral/conjunctival abnormalities.  RESP: No audible wheeze, cough, or visible cyanosis.  No visible retractions or increased work of breathing.    SKIN: Visible skin clear. No significant rash, abnormal pigmentation or lesions.  NEURO: Cranial nerves grossly intact.  Mentation and speech appropriate for age.  PSYCH: Mentation appears normal, affect normal/bright, judgement and insight intact, normal speech and appearance well-groomed.            Assessment & Plan     1. Acute recurrent maxillary sinusitis  She has had some probable chronic sinus disease making her more susceptible to recurrent infections.  She has been trying home treatment for this without relief and more significant pain in the past few days so we will go ahead and treat with an antibiotic.  I also suggested that she start on Flonase and consider using low-dose chronically to help keep the amount of edema down and that may help prevent further infections.  Continue the other treatments, call if not improving  - azithromycin (ZITHROMAX) 250 MG tablet; Take 2 tablets (500 mg) by mouth daily for 1 day, THEN 1 tablet (250 mg) daily for 4 days.  Dispense: 6 tablet; Refill: 0    2. Chronic rhinitis  Recommend use the Flonase regularly.  - fluticasone (FLONASE) 50 MCG/ACT nasal spray; Spray 2 sprays into both nostrils daily  Dispense: 18.2 mL; Refill: 11           Return in about 2 months (around 10/13/2020) for Physical Exam.    Neena Walker MD  [facility]      Video-Visit Details    Type of service:  Video Visit    Video End Time:10:51    Originating Location (pt. Location): Home    Distant Location (provider location):  home    Platform used for Video Visit: [employer]

## 2021-01-15 ENCOUNTER — HEALTH MAINTENANCE LETTER (OUTPATIENT)
Age: 61
End: 2021-01-15

## 2021-05-06 ENCOUNTER — HOSPITAL ENCOUNTER (OUTPATIENT)
Dept: MAMMOGRAPHY | Facility: CLINIC | Age: 61
Discharge: HOME | End: 2021-05-06
Attending: INTERNAL MEDICINE | Admitting: INTERNAL MEDICINE
Payer: COMMERCIAL

## 2021-05-06 DIAGNOSIS — Z12.31 VISIT FOR SCREENING MAMMOGRAM: ICD-10-CM

## 2021-05-06 PROCEDURE — 77067 SCR MAMMO BI INCL CAD: CPT | Mod: 52

## 2021-05-07 ENCOUNTER — OFFICE VISIT (OUTPATIENT)
Dept: INTERNAL MEDICINE | Facility: CLINIC | Age: 61
End: 2021-05-07
Payer: COMMERCIAL

## 2021-05-07 VITALS
HEIGHT: 66 IN | DIASTOLIC BLOOD PRESSURE: 91 MMHG | TEMPERATURE: 98.1 F | WEIGHT: 200 LBS | BODY MASS INDEX: 32.14 KG/M2 | SYSTOLIC BLOOD PRESSURE: 177 MMHG | HEART RATE: 108 BPM | RESPIRATION RATE: 18 BRPM | OXYGEN SATURATION: 98 %

## 2021-05-07 DIAGNOSIS — Z00.00 ENCOUNTER FOR ROUTINE ADULT HEALTH EXAMINATION WITHOUT ABNORMAL FINDINGS: Primary | ICD-10-CM

## 2021-05-07 DIAGNOSIS — G43.009 MIGRAINE WITHOUT AURA AND WITHOUT STATUS MIGRAINOSUS, NOT INTRACTABLE: ICD-10-CM

## 2021-05-07 DIAGNOSIS — R73.09 ABNORMAL GLUCOSE: ICD-10-CM

## 2021-05-07 DIAGNOSIS — E78.1 HYPERTRIGLYCERIDEMIA: ICD-10-CM

## 2021-05-07 DIAGNOSIS — Z11.59 SCREENING FOR VIRAL DISEASE: ICD-10-CM

## 2021-05-07 DIAGNOSIS — Z12.11 SCREEN FOR COLON CANCER: ICD-10-CM

## 2021-05-07 DIAGNOSIS — Z13.6 CARDIOVASCULAR SCREENING; LDL GOAL LESS THAN 130: ICD-10-CM

## 2021-05-07 DIAGNOSIS — R03.0 ELEVATED BLOOD PRESSURE READING WITHOUT DIAGNOSIS OF HYPERTENSION: ICD-10-CM

## 2021-05-07 DIAGNOSIS — E78.5 HYPERLIPIDEMIA LDL GOAL <130: ICD-10-CM

## 2021-05-07 LAB
ANION GAP SERPL CALCULATED.3IONS-SCNC: 2 MMOL/L (ref 3–14)
BUN SERPL-MCNC: 10 MG/DL (ref 7–30)
CALCIUM SERPL-MCNC: 9.7 MG/DL (ref 8.5–10.1)
CHLORIDE SERPL-SCNC: 105 MMOL/L (ref 94–109)
CHOLEST SERPL-MCNC: 233 MG/DL
CO2 SERPL-SCNC: 30 MMOL/L (ref 20–32)
CREAT SERPL-MCNC: 0.66 MG/DL (ref 0.52–1.04)
GFR SERPL CREATININE-BSD FRML MDRD: >90 ML/MIN/{1.73_M2}
GLUCOSE SERPL-MCNC: 100 MG/DL (ref 70–99)
HBA1C MFR BLD: 5.7 % (ref 0–5.6)
HDLC SERPL-MCNC: 49 MG/DL
LDLC SERPL CALC-MCNC: 163 MG/DL
NONHDLC SERPL-MCNC: 184 MG/DL
POTASSIUM SERPL-SCNC: 4 MMOL/L (ref 3.4–5.3)
SODIUM SERPL-SCNC: 137 MMOL/L (ref 133–144)
TRIGL SERPL-MCNC: 106 MG/DL

## 2021-05-07 PROCEDURE — 80061 LIPID PANEL: CPT | Performed by: INTERNAL MEDICINE

## 2021-05-07 PROCEDURE — 87389 HIV-1 AG W/HIV-1&-2 AB AG IA: CPT | Performed by: INTERNAL MEDICINE

## 2021-05-07 PROCEDURE — 87624 HPV HI-RISK TYP POOLED RSLT: CPT | Performed by: INTERNAL MEDICINE

## 2021-05-07 PROCEDURE — 99396 PREV VISIT EST AGE 40-64: CPT | Performed by: INTERNAL MEDICINE

## 2021-05-07 PROCEDURE — 80048 BASIC METABOLIC PNL TOTAL CA: CPT | Performed by: INTERNAL MEDICINE

## 2021-05-07 PROCEDURE — 36415 COLL VENOUS BLD VENIPUNCTURE: CPT | Performed by: INTERNAL MEDICINE

## 2021-05-07 PROCEDURE — 83036 HEMOGLOBIN GLYCOSYLATED A1C: CPT | Performed by: INTERNAL MEDICINE

## 2021-05-07 PROCEDURE — 86803 HEPATITIS C AB TEST: CPT | Performed by: INTERNAL MEDICINE

## 2021-05-07 PROCEDURE — 99213 OFFICE O/P EST LOW 20 MIN: CPT | Mod: 25 | Performed by: INTERNAL MEDICINE

## 2021-05-07 PROCEDURE — G0145 SCR C/V CYTO,THINLAYER,RESCR: HCPCS | Performed by: INTERNAL MEDICINE

## 2021-05-07 RX ORDER — SILICONE ADHESIVE 1.5" X 3"
1-2 SHEET (EA) TOPICAL
COMMUNITY

## 2021-05-07 ASSESSMENT — ENCOUNTER SYMPTOMS
HEMATOCHEZIA: 0
HEMATURIA: 0
CONSTIPATION: 0
ABDOMINAL PAIN: 0
COUGH: 0
DIARRHEA: 0
CHILLS: 0

## 2021-05-07 ASSESSMENT — MIFFLIN-ST. JEOR: SCORE: 1484.97

## 2021-05-07 NOTE — NURSING NOTE
"BP (!) 177/91 (BP Location: Left arm, Patient Position: Sitting, Cuff Size: Adult Regular)   Pulse 108   Temp 98.1  F (36.7  C) (Oral)   Resp 18   Ht 1.67 m (5' 5.75\")   Wt 90.7 kg (200 lb)   LMP 12/22/2009   SpO2 98%   BMI 32.53 kg/m      "

## 2021-05-07 NOTE — PATIENT INSTRUCTIONS
Consider shingles vaccine at the pharmacy.     TdaP is the tetanus shot you need.     Send some BP readings in a few weeks.

## 2021-05-07 NOTE — PROGRESS NOTES
SUBJECTIVE:   CC: Lisbet Archer is an 60 year old woman who presents for preventive health visit.       Patient has been advised of split billing requirements and indicates understanding: Yes  Healthy Habits:     Getting at least 3 servings of Calcium per day:  Yes    Bi-annual eye exam:  Yes    Dental care twice a year:  Yes    Sleep apnea or symptoms of sleep apnea:  Daytime drowsiness    Diet:  Regular (no restrictions)    Frequency of exercise:  1 day/week    Duration of exercise:  Less than 15 minutes    Taking medications regularly:  Yes    Medication side effects:  None    PHQ-2 Total Score: 0    Additional concerns today:  Yes    Ability to successfully perform activities of daily living: Yes, no assistance needed  Home safety:  none identified   Hearing impairment: None    Today's PHQ-2 Score:   PHQ-2 ( 1999 [employer]) 5/7/2021   Q1: Little interest or pleasure in doing things -   Q2: Feeling down, depressed or hopeless -   PHQ-2 Score -   Q1: Little interest or pleasure in doing things -   Q2: Feeling down, depressed or hopeless -   PHQ-2 Score Incomplete       Abuse: Current or Past (Physical, Sexual or Emotional) - No  Do you feel safe in your environment? Yes    Problems:  1.  Elevated blood pressure: She has not had any blood pressure checks done for over a year, previous levels have been high but she never followed through on it.  No symptoms.  2.  Migraine: Overall fairly stable  3.  History of breast cancer: Had mammogram yesterday.  No symptoms.     Social History     Tobacco Use     Smoking status: Never Smoker     Smokeless tobacco: Never Used   Substance Use Topics     Alcohol use: No     If you drink alcohol do you typically have >3 drinks per day or >7 drinks per week? No    Reviewed orders with patient.  Reviewed health maintenance and updated orders accordingly - Yes      Breast Cancer Screening:  Any new diagnosis of family breast, ovarian, or bowel cancer? No    FSH-7: No flowsheet  data found.  click delete button to remove this line now  Mammogram Screening - Alternate mammogram schedule due to breast cancer history  Pertinent mammograms are reviewed under the imaging tab.    History of abnormal Pap smear: NO - age 30-65 PAP every 5 years with negative HPV co-testing recommended  PAP / HPV Latest Ref Rng & Units 7/28/2015 9/20/2012 2/10/2009   PAP - NIL NIL NIL   HPV 16 DNA NEG Negative - -   HPV 18 DNA NEG Negative - -   OTHER HR HPV NEG Negative - -     Reviewed and updated as needed this visit by clinical staff                 Reviewed and updated as needed this visit by Provider                Patient Active Problem List   Diagnosis     History of breast cancer     CARDIOVASCULAR SCREENING; LDL GOAL LESS THAN 130     Bilateral low back pain without sciatica     Knee pain     Neck pain     Hypertriglyceridemia     Elevated blood pressure reading without diagnosis of hypertension     Migraine without aura and without status migrainosus, not intractable     Current Outpatient Medications   Medication Sig Dispense Refill     dextromethorphan-guaiFENesin (MUCINEX DM)  MG 12 hr tablet Take 1 tablet by mouth every 12 hours       fexofenadine (ALLEGRA) 30 MG ODT Take 30 mg by mouth as needed for allergies       fluticasone (FLONASE) 50 MCG/ACT nasal spray Spray 2 sprays into both nostrils daily 18.2 mL 11     fluticasone (FLONASE) 50 MCG/ACT nasal spray Spray 1 spray into both nostrils as needed        gemfibrozil (LOPID) 600 MG tablet Take 1 tablet (600 mg) by mouth 2 times daily 180 tablet 3     IBUPROFEN 200 MG OR TABS 1-2 TABLETS EVERY 4 TO 6 HOURS AS NEEDED       sodium chloride (ALETHEA 128) 5 % ophthalmic solution 1-2 drops every 3 hours as needed for dry eyes       sodium chloride (OCEAN) 0.65 % nasal spray Spray 1 spray into both nostrils daily as needed for congestion       albuterol (PROAIR HFA/PROVENTIL HFA/VENTOLIN HFA) 108 (90 Base) MCG/ACT inhaler Inhale 2 puffs into the lungs  "every 4 hours as needed for shortness of breath / dyspnea or wheezing 1 Inhaler 2     diclofenac (VOLTAREN) 0.1 % ophthalmic solution 1 drop 4 times daily          Review of Systems   Constitutional: Negative for chills.   HENT: Negative for congestion.    Respiratory: Negative for cough.    Cardiovascular: Negative for chest pain.   Gastrointestinal: Negative for abdominal pain, constipation, diarrhea and hematochezia.   Genitourinary: Negative for hematuria.   No vaginal bleeding or discharge  Musculoskeletal: Occasional back pain  Neurologic: Stable migraines, otherwise negative  Mood: Stable  Endocrine: Negative       OBJECTIVE:   BP (!) 177/91 (BP Location: Left arm, Patient Position: Sitting, Cuff Size: Adult Regular)   Pulse 108   Temp 98.1  F (36.7  C) (Oral)   Resp 18   Ht 1.67 m (5' 5.75\")   Wt 90.7 kg (200 lb)   LMP 12/22/2009   SpO2 98%   BMI 32.53 kg/m    Physical Exam        HEENT: extraocular movements are intact, pupils equal and reactive to light and accommodation, TMs clear  NECK: Neck supple. No adenopathy. Thyroid symmetric, normal size,, Carotids without bruits.  PULMONARY: clear to auscultation  CARDIAC: regular rate and rhythm and no murmurs, clicks, or gallops  PULSES: 2/2 throughout  BACK: no spinal or CVAT  ABDOMINAL: Soft, nontender.  Normal bowel sounds.  No hepatosplenomegaly or abnormal masses  BREAST: No breast masses or tenderness, No axillary masses or tenderness and No galactorrhea  PELVIC: external genitalia normal, vaginal mucosa normal, cervix normal, specimen sent for pap, bimanual exam with normal uterus and adnexa,   REFLEXES: 2+ throughout        ASSESSMENT/PLAN:   1. Encounter for routine adult health examination without abnormal findings  She is due for Tdap but declines it today, will look into getting it at the pharmacy since she had had previous moderate reaction.  Also suggested shingles vaccine  - Pap imaged thin layer screen with HPV - recommended age 30 - 65 " "years (select HPV order below)  - HPV High Risk Types DNA Cervical    2. Elevated blood pressure reading without diagnosis of hypertension  Strongly recommend she do some monitoring, may need treatment.  Watch salt intake.  - Basic metabolic panel  (Ca, Cl, CO2, Creat, Gluc, K, Na, BUN)    3. Hypertriglyceridemia  Recheck lab  - Lipid panel reflex to direct LDL Fasting    4. Migraine without aura and without status migrainosus, not intractable  Stable    5. CARDIOVASCULAR SCREENING; LDL GOAL LESS THAN 130    - Lipid panel reflex to direct LDL Fasting    6. Abnormal glucose  recheck  - Basic metabolic panel  (Ca, Cl, CO2, Creat, Gluc, K, Na, BUN)  - Hemoglobin A1c    7. Screening for viral disease    - HIV Antigen Antibody Combo  - Hepatitis C Screen Reflex to HCV RNA Quant and Genotype    8. Screen for colon cancer    - GASTROENTEROLOGY ADULT REF PROCEDURE ONLY; Future    Patient has been advised of split billing requirements and indicates understanding: Yes  COUNSELING:  Reviewed preventive health counseling, as reflected in patient instructions    Estimated body mass index is 33.36 kg/m  as calculated from the following:    Height as of 2/2/19: 1.702 m (5' 7\").    Weight as of 2/2/19: 96.6 kg (213 lb).    Weight management plan: Discussed healthy diet and exercise guidelines    She reports that she has never smoked. She has never used smokeless tobacco.      Counseling Resources:  ATP IV Guidelines  Pooled Cohorts Equation Calculator  Breast Cancer Risk Calculator  BRCA-Related Cancer Risk Assessment: FHS-7 Tool  FRAX Risk Assessment  ICSI Preventive Guidelines  Dietary Guidelines for Americans, 2010  USDA's MyPlate  ASA Prophylaxis  Lung CA Screening    Neena Walker MD  [facility]  "

## 2021-05-09 LAB
HCV AB SERPL QL IA: NONREACTIVE
HIV 1+2 AB+HIV1 P24 AG SERPL QL IA: NONREACTIVE

## 2021-05-11 PROBLEM — E78.5 HYPERLIPIDEMIA LDL GOAL <130: Status: ACTIVE | Noted: 2021-05-11

## 2021-05-11 LAB
COPATH REPORT: NORMAL
PAP: NORMAL

## 2021-05-12 LAB
FINAL DIAGNOSIS: NORMAL
HPV HR 12 DNA CVX QL NAA+PROBE: NEGATIVE
HPV16 DNA SPEC QL NAA+PROBE: NEGATIVE
HPV18 DNA SPEC QL NAA+PROBE: NEGATIVE
SPECIMEN DESCRIPTION: NORMAL
SPECIMEN SOURCE CVX/VAG CYTO: NORMAL

## 2021-06-09 DIAGNOSIS — E78.1 HYPERTRIGLYCERIDEMIA: ICD-10-CM

## 2021-06-11 RX ORDER — GEMFIBROZIL 600 MG/1
TABLET, FILM COATED ORAL
Qty: 180 TABLET | Refills: 2 | Status: SHIPPED | OUTPATIENT
Start: 2021-06-11 | End: 2022-09-07

## 2021-06-11 NOTE — TELEPHONE ENCOUNTER
Pending Prescriptions:                       Disp   Refills    gemfibrozil (LOPID) 600 MG tablet [Pharma*180 ta*2            Sig: TAKE 1 TABLET(600 MG) BY MOUTH TWICE DAILY    Prescription approved per [facility] Refill Protocol.

## 2021-09-05 ENCOUNTER — HEALTH MAINTENANCE LETTER (OUTPATIENT)
Age: 61
End: 2021-09-05

## 2022-06-12 ENCOUNTER — HEALTH MAINTENANCE LETTER (OUTPATIENT)
Age: 62
End: 2022-06-12

## 2022-08-07 ENCOUNTER — HEALTH MAINTENANCE LETTER (OUTPATIENT)
Age: 62
End: 2022-08-07

## 2022-09-07 ENCOUNTER — VIRTUAL VISIT (OUTPATIENT)
Dept: PEDIATRICS | Facility: CLINIC | Age: 62
End: 2022-09-07
Payer: COMMERCIAL

## 2022-09-07 DIAGNOSIS — U07.1 INFECTION DUE TO 2019 NOVEL CORONAVIRUS: Primary | ICD-10-CM

## 2022-09-07 PROCEDURE — 99213 OFFICE O/P EST LOW 20 MIN: CPT | Mod: CS | Performed by: PHYSICIAN ASSISTANT

## 2022-09-07 NOTE — PROGRESS NOTES
Lisbet is a 62 year old who is being evaluated via a billable telephone visit.      Assessment & Plan     Infection due to 2019 novel coronavirus  Patient meets criteria based on BMI (>30). RX sent. Patient has no further questions.  - nirmatrelvir and ritonavir (PAXLOVID) therapy pack; Take 3 tablets by mouth 2 times daily for 5 days (Take 2 Nirmatrelvir tablets and 1 Ritonavir tablet twice daily for 5 days)    Cosmo Welch PA-C  [facility]    Subjective   Lisbet is a 62 year old presenting for the following health issues:  No chief complaint on file.      HPI   COVID-19 Symptom Review  How many days ago did these symptoms start? Yesterday   ill.   Vaccinated against covid.    Are any of the following symptoms significant for you?    New or worsening difficulty breathing? No    Worsening cough? Yes, it's a dry cough.     Fever or chills? Yes, I felt feverish or had chills.    Headache: YES    Sore throat: YES    Chest pain: No    Diarrhea: YES    vomiting    Body aches? YES     What treatments has patient tried? Ibuprofen, allergy pills, tylenol last night  Does patient live in a nursing home, group home, or shelter? No  Does patient have a way to get food/medications during quarantined? Yes, I have a friend or family member who can help me.    Review of Systems   Constitutional, HEENT, cardiovascular, pulmonary, gi and gu systems are negative, except as otherwise noted.      Objective           Vitals:  No vitals were obtained today due to virtual visit.    Physical Exam   alert and no distress  PSYCH: Alert and oriented times 3; coherent speech, normal   rate and volume, able to articulate logical thoughts, able   to abstract reason, no tangential thoughts, no hallucinations   or delusions  Her affect is normal  RESP: No cough, no audible wheezing, able to talk in full sentences  Remainder of exam unable to be completed due to telephone visits        Phone call duration:  7 minutes

## 2022-09-18 ENCOUNTER — E-VISIT (OUTPATIENT)
Dept: URGENT CARE | Facility: URGENT CARE | Age: 62
End: 2022-09-18
Payer: COMMERCIAL

## 2022-09-18 DIAGNOSIS — J01.90 ACUTE SINUSITIS WITH SYMPTOMS > 10 DAYS: Primary | ICD-10-CM

## 2022-09-18 PROCEDURE — 99421 OL DIG E/M SVC 5-10 MIN: CPT | Performed by: INTERNAL MEDICINE

## 2022-09-18 NOTE — PATIENT INSTRUCTIONS
Sinusitis (Antibiotic Treatment)    The sinuses are air-filled spaces within the bones of the face. They connect to the inside of the nose. Sinusitis is an inflammation of the tissue that lines the sinuses. Sinusitis can occur during a cold. It can also happen due to allergies to pollens and other particles in the air. Sinusitis can cause symptoms of sinus congestion and a feeling of fullness. A sinus infection causes fever, headache, and facial pain. There is often green or yellow fluid draining from the nose or into the back of the throat (post-nasal drip). You have been given antibiotics to treat this condition.   Home care    Take the full course of antibiotics as instructed. Don't stop taking them, even when you feel better.    Drink plenty of water, hot tea, and other liquids as directed by the healthcare provider. This may help thin nasal mucus. It also may help your sinuses drain fluids.    Heat may help soothe painful areas of your face. Use a towel soaked in hot water. Or,  the shower and direct the warm spray onto your face. Using a vaporizer along with a menthol rub at night may also help soothe symptoms.     An expectorant with guaifenesin may help thin nasal mucus and help your sinuses drain fluids. Talk with your provider or pharmacists before taking an over-the-counter (OTC) medicine if you have any questions about it or its side effects..    You can use an OTC decongestant, unless a similar medicine was prescribed to you. Nasal sprays work the fastest. Use one that contains phenylephrine or oxymetazoline. First blow your nose gently. Then use the spray. Don't use these medicines more often than directed on the label. If you do, your symptoms may get worse. You may also take pills that contain pseudoephedrine. Don t use products that combine multiple medicines. This is because side effects may be increased. Read labels. You can also ask the pharmacist for help. (People with high blood  pressure should not use decongestants. They can raise blood pressure.) Talk with your provider or pharmacist if you have any questions about the medicine..    OTC antihistamines may help if allergies contributed to your sinusitis. Talk with your provider or pharmacist if you have any questions about the medicine..    Don't use nasal rinses or irrigation during an acute sinus infection, unless your healthcare provider tells you to. Rinsing may spread the infection to other areas in your sinuses.    Use acetaminophen or ibuprofen to control pain, unless another pain medicine was prescribed to you. If you have chronic liver or kidney disease or ever had a stomach ulcer, talk with your healthcare provider before using these medicines. Never give aspirin to anyone under age 18 who is ill with a fever. It may cause severe liver damage.    Don't smoke. This can make symptoms worse.    Follow-up care  Follow up with your healthcare provider, or as advised.   When to seek medical advice  Call your healthcare provider if any of these occur:     Facial pain or headache that gets worse    Stiff neck    Unusual drowsiness or confusion    Swelling of your forehead or eyelids    Symptoms don't go away in 10 days    Vision problems, such as blurred or double vision    Fever of 100.4 F (38 C) or higher, or as directed by your healthcare provider  Call 911  Call 911 if any of these occur:     Seizure    Trouble breathing    Feeling dizzy or faint    Fingernails, skin or lips look blue, purple , or gray  Prevention  Here are steps you can take to help prevent an infection:     Keep good hand washing habits.    Don t have close contact with people who have sore throats, colds, or other upper respiratory infections.    Don t smoke, and stay away from secondhand smoke.    Stay up to date with of your vaccines.  [employer] last reviewed this educational content on 12/1/2019 2000-2021 [employer]. All rights reserved. This  information is not intended as a substitute for professional medical care. Always follow your healthcare professional's instructions.        Dear Lisbet Archer    After reviewing your responses, I've been able to diagnose you with?a sinus infection caused by bacteria.?     Based on your responses and diagnosis, I have prescribed Augmentin twice daily for 1 week to treat your symptoms. I have sent this to your pharmacy.? Please eat yogurt or take a probiotic to help prevent diarrhea from antibiotics.  I would avoid any antihistamines and cold medications as these can dry out the mucus in your sinuses and make it difficult to drain out.  This would contribute to sinus infection    It is also important to stay well hydrated, get lots of rest and take over-the-counter decongestants,?tylenol?or ibuprofen if you?are able to?take those medications per your primary care provider to help relieve discomfort.?     It is important that you take?all of?your prescribed medication even if your symptoms are improving after a few doses.? Taking?all of?your medicine helps prevent the symptoms from returning.?     If your symptoms worsen, you develop severe headache, vomiting, high fever (>102), or are not improving in 7 days, please contact your primary care provider for an appointment or visit any of our convenient Walk-in Care or Urgent Care Centers to be seen which can be found on our website?here.?     Thanks again for choosing?us?as your health care partner,?   ?  Beverly Almanzar MD?

## 2022-09-27 ENCOUNTER — MYC MEDICAL ADVICE (OUTPATIENT)
Dept: INTERNAL MEDICINE | Facility: CLINIC | Age: 62
End: 2022-09-27

## 2022-09-28 NOTE — TELEPHONE ENCOUNTER
See Vericept message. Please advise.   Last OV on 5/7/21. Sent message to schedule Physical exam soon.

## 2022-10-23 ENCOUNTER — HEALTH MAINTENANCE LETTER (OUTPATIENT)
Age: 62
End: 2022-10-23

## 2023-06-18 ENCOUNTER — HEALTH MAINTENANCE LETTER (OUTPATIENT)
Age: 63
End: 2023-06-18

## 2023-08-27 ENCOUNTER — HEALTH MAINTENANCE LETTER (OUTPATIENT)
Age: 63
End: 2023-08-27

## 2023-08-29 ENCOUNTER — HOSPITAL ENCOUNTER (OUTPATIENT)
Dept: MAMMOGRAPHY | Facility: CLINIC | Age: 63
Discharge: HOME | End: 2023-08-29
Attending: INTERNAL MEDICINE | Admitting: INTERNAL MEDICINE
Payer: COMMERCIAL

## 2023-08-29 DIAGNOSIS — Z12.31 VISIT FOR SCREENING MAMMOGRAM: ICD-10-CM

## 2023-08-29 PROCEDURE — 77067 SCR MAMMO BI INCL CAD: CPT | Mod: 52

## 2024-10-20 ENCOUNTER — HEALTH MAINTENANCE LETTER (OUTPATIENT)
Age: 64
End: 2024-10-20

## 2024-12-22 ENCOUNTER — HEALTH MAINTENANCE LETTER (OUTPATIENT)
Age: 64
End: 2024-12-22